# Patient Record
Sex: MALE | Race: WHITE | NOT HISPANIC OR LATINO
[De-identification: names, ages, dates, MRNs, and addresses within clinical notes are randomized per-mention and may not be internally consistent; named-entity substitution may affect disease eponyms.]

---

## 2017-01-25 ENCOUNTER — APPOINTMENT (OUTPATIENT)
Dept: GASTROENTEROLOGY | Facility: CLINIC | Age: 32
End: 2017-01-25

## 2017-11-02 ENCOUNTER — APPOINTMENT (OUTPATIENT)
Dept: CARDIOLOGY | Facility: CLINIC | Age: 32
End: 2017-11-02
Payer: SELF-PAY

## 2017-11-02 ENCOUNTER — NON-APPOINTMENT (OUTPATIENT)
Age: 32
End: 2017-11-02

## 2017-11-02 VITALS
SYSTOLIC BLOOD PRESSURE: 148 MMHG | DIASTOLIC BLOOD PRESSURE: 92 MMHG | WEIGHT: 308 LBS | OXYGEN SATURATION: 98 % | HEART RATE: 95 BPM | BODY MASS INDEX: 37.49 KG/M2

## 2017-11-02 DIAGNOSIS — Z80.1 FAMILY HISTORY OF MALIGNANT NEOPLASM OF TRACHEA, BRONCHUS AND LUNG: ICD-10-CM

## 2017-11-02 DIAGNOSIS — Z82.49 FAMILY HISTORY OF ISCHEMIC HEART DISEASE AND OTHER DISEASES OF THE CIRCULATORY SYSTEM: ICD-10-CM

## 2017-11-02 DIAGNOSIS — Z83.3 FAMILY HISTORY OF DIABETES MELLITUS: ICD-10-CM

## 2017-11-02 PROCEDURE — 93000 ELECTROCARDIOGRAM COMPLETE: CPT

## 2017-11-02 PROCEDURE — 99204 OFFICE O/P NEW MOD 45 MIN: CPT

## 2017-11-16 ENCOUNTER — APPOINTMENT (OUTPATIENT)
Dept: CARDIOLOGY | Facility: CLINIC | Age: 32
End: 2017-11-16
Payer: SELF-PAY

## 2017-11-16 PROCEDURE — 93306 TTE W/DOPPLER COMPLETE: CPT

## 2017-12-04 ENCOUNTER — APPOINTMENT (OUTPATIENT)
Dept: CARDIOLOGY | Facility: CLINIC | Age: 32
End: 2017-12-04
Payer: COMMERCIAL

## 2017-12-04 PROCEDURE — 93015 CV STRESS TEST SUPVJ I&R: CPT

## 2017-12-07 ENCOUNTER — MESSAGE (OUTPATIENT)
Age: 32
End: 2017-12-07

## 2017-12-08 ENCOUNTER — MESSAGE (OUTPATIENT)
Age: 32
End: 2017-12-08

## 2018-05-08 ENCOUNTER — APPOINTMENT (OUTPATIENT)
Dept: CARDIOLOGY | Facility: CLINIC | Age: 33
End: 2018-05-08

## 2018-07-17 ENCOUNTER — APPOINTMENT (OUTPATIENT)
Dept: INTERNAL MEDICINE | Facility: CLINIC | Age: 33
End: 2018-07-17
Payer: COMMERCIAL

## 2018-07-17 ENCOUNTER — RECORD ABSTRACTING (OUTPATIENT)
Age: 33
End: 2018-07-17

## 2018-07-17 VITALS
TEMPERATURE: 99 F | BODY MASS INDEX: 35.31 KG/M2 | HEIGHT: 76 IN | WEIGHT: 290 LBS | DIASTOLIC BLOOD PRESSURE: 80 MMHG | SYSTOLIC BLOOD PRESSURE: 110 MMHG

## 2018-07-17 DIAGNOSIS — J45.909 UNSPECIFIED ASTHMA, UNCOMPLICATED: ICD-10-CM

## 2018-07-17 DIAGNOSIS — R03.0 ELEVATED BLOOD-PRESSURE READING, W/OUT DIAGNOSIS OF HYPERTENSION: ICD-10-CM

## 2018-07-17 DIAGNOSIS — F19.20 OTHER PSYCHOACTIVE SUBSTANCE DEPENDENCE, UNCOMPLICATED: ICD-10-CM

## 2018-07-17 DIAGNOSIS — F11.11 OPIOID ABUSE, IN REMISSION: ICD-10-CM

## 2018-07-17 DIAGNOSIS — Z86.59 PERSONAL HISTORY OF OTHER MENTAL AND BEHAVIORAL DISORDERS: ICD-10-CM

## 2018-07-17 DIAGNOSIS — M51.26 OTHER INTERVERTEBRAL DISC DISPLACEMENT, LUMBAR REGION: ICD-10-CM

## 2018-07-17 DIAGNOSIS — Z87.19 PERSONAL HISTORY OF OTHER DISEASES OF THE DIGESTIVE SYSTEM: ICD-10-CM

## 2018-07-17 DIAGNOSIS — M25.529 PAIN IN UNSPECIFIED ELBOW: ICD-10-CM

## 2018-07-17 DIAGNOSIS — M54.12 RADICULOPATHY, CERVICAL REGION: ICD-10-CM

## 2018-07-17 DIAGNOSIS — Z78.9 OTHER SPECIFIED HEALTH STATUS: ICD-10-CM

## 2018-07-17 DIAGNOSIS — Z80.1 FAMILY HISTORY OF MALIGNANT NEOPLASM OF TRACHEA, BRONCHUS AND LUNG: ICD-10-CM

## 2018-07-17 DIAGNOSIS — R94.5 ABNORMAL RESULTS OF LIVER FUNCTION STUDIES: ICD-10-CM

## 2018-07-17 DIAGNOSIS — R07.89 OTHER CHEST PAIN: ICD-10-CM

## 2018-07-17 DIAGNOSIS — Z87.39 PERSONAL HISTORY OF OTHER DISEASES OF THE MUSCULOSKELETAL SYSTEM AND CONNECTIVE TISSUE: ICD-10-CM

## 2018-07-17 DIAGNOSIS — Z72.0 TOBACCO USE: ICD-10-CM

## 2018-07-17 DIAGNOSIS — R73.03 PREDIABETES.: ICD-10-CM

## 2018-07-17 DIAGNOSIS — Z71.6 TOBACCO ABUSE COUNSELING: ICD-10-CM

## 2018-07-17 DIAGNOSIS — Z72.0 TOBACCO ABUSE COUNSELING: ICD-10-CM

## 2018-07-17 DIAGNOSIS — R70.0 ELEVATED ERYTHROCYTE SEDIMENTATION RATE: ICD-10-CM

## 2018-07-17 PROCEDURE — 99215 OFFICE O/P EST HI 40 MIN: CPT

## 2018-07-17 NOTE — REVIEW OF SYSTEMS
[Fever] : fever [Chills] : chills [Fatigue] : fatigue [Nausea] : nausea [Dysuria] : dysuria [Frequency] : frequency [Negative] : Neurological [Vomiting] : no vomiting

## 2018-07-17 NOTE — PHYSICAL EXAM
[No Acute Distress] : no acute distress [Ill-Appearing] : ill-appearing [Normal Sclera/Conjunctiva] : normal sclera/conjunctiva [Normal Outer Ear/Nose] : the outer ears and nose were normal in appearance [No Respiratory Distress] : no respiratory distress  [Clear to Auscultation] : lungs were clear to auscultation bilaterally [No Accessory Muscle Use] : no accessory muscle use [Normal Rate] : normal rate  [Regular Rhythm] : with a regular rhythm [Normal S1, S2] : normal S1 and S2 [Soft] : abdomen soft [Normal] : normal [Suprapubic] : in the suprapubic area [Left] : left CVA tenderness present

## 2018-07-17 NOTE — HISTORY OF PRESENT ILLNESS
[FreeTextEntry8] : 33-year-old male smoker with history of diabetes who presented to urgent care on July 15 with complaints of dysuria. He was told he had a UTI and started on Bactrim and sent for CT to rule out a kidney stone.  CT showed a 0.4 cm stone at the right UVJ with mild hydrouteronephrosis. Since then he has developed a fever and chills. He has nausea but no vomiting. He has no difficulty with urination. He complains of bilateral flank pain and suprapubic pain. He took tylenol just before coming here.

## 2018-07-17 NOTE — ASSESSMENT
[FreeTextEntry1] : Given the CT findings and he is now febrile will send him to the ER as he may need to have an intervention for the stone and possibly IV antibiotics.

## 2018-07-20 ENCOUNTER — RESULT CHARGE (OUTPATIENT)
Age: 33
End: 2018-07-20

## 2018-07-20 LAB
BILIRUB UR QL STRIP: NORMAL
CLARITY UR: CLEAR
COLLECTION METHOD: NORMAL
GLUCOSE UR-MCNC: 100
HCG UR QL: 1 EU/DL
HGB UR QL STRIP.AUTO: NORMAL
KETONES UR-MCNC: NORMAL
LEUKOCYTE ESTERASE UR QL STRIP: NORMAL
NITRITE UR QL STRIP: NORMAL
PH UR STRIP: 6
PROT UR STRIP-MCNC: 30
SP GR UR STRIP: 1.01

## 2018-08-01 ENCOUNTER — APPOINTMENT (OUTPATIENT)
Dept: INTERNAL MEDICINE | Facility: CLINIC | Age: 33
End: 2018-08-01
Payer: COMMERCIAL

## 2018-08-01 VITALS
RESPIRATION RATE: 16 BRPM | TEMPERATURE: 97.9 F | HEART RATE: 108 BPM | HEIGHT: 76 IN | DIASTOLIC BLOOD PRESSURE: 80 MMHG | BODY MASS INDEX: 34.95 KG/M2 | WEIGHT: 287 LBS | SYSTOLIC BLOOD PRESSURE: 120 MMHG

## 2018-08-01 DIAGNOSIS — Z87.440 PERSONAL HISTORY OF URINARY (TRACT) INFECTIONS: ICD-10-CM

## 2018-08-01 PROCEDURE — G0444 DEPRESSION SCREEN ANNUAL: CPT

## 2018-08-01 PROCEDURE — 99214 OFFICE O/P EST MOD 30 MIN: CPT | Mod: 25

## 2018-08-02 RX ORDER — SULFAMETHOXAZOLE AND TRIMETHOPRIM 800; 160 MG/1; MG/1
800-160 TABLET ORAL
Refills: 0 | Status: DISCONTINUED | COMMUNITY
End: 2018-08-02

## 2018-08-02 NOTE — HISTORY OF PRESENT ILLNESS
[No Pertinent Cardiac History] : no history of aortic stenosis, atrial fibrillation, coronary artery disease, recent myocardial infarction, or implantable device/pacemaker [No Pertinent Pulmonary History] : no history of asthma, COPD, sleep apnea, or smoking [No Adverse Anesthesia Reaction] : no adverse anesthesia reaction in self or family member [Diabetes] : diabetes [Good (7-10 METs)] : Good (7-10 METs) [NSAIDs: _____] : NSAIDs: [unfilled] [FreeTextEntry1] : right retrograde pyelogram, laser lithotripsy and stent placement [FreeTextEntry2] : 8/7/28 [FreeTextEntry3] : Dr Lucho Berrios [FreeTextEntry4] : 33-year-old male smoker with history of diabetes who was seen here on July 17 after an urgent care visit for UTI. He had a CT at that time which showed a 0.4 cm stone at the right UVJ with mild hydroureternephrosis. At the time of his visit he had developed fevers and chills as well as nausea. He was sent to the emergency room for evaluation. He was admitted for 4 days and was given IV antibiotics and had a stent placed. He was discharged on 10 days of p.o. Cephalexin. He is planned for a lithotripsy and stent placement. [FreeTextEntry6] : None

## 2018-08-02 NOTE — REVIEW OF SYSTEMS
[Abdominal Pain] : abdominal pain [Vomiting] : no vomiting [Negative] : Heme/Lymph [FreeTextEntry8] : as noted in HPI  [FreeTextEntry9] : muscle spasm

## 2018-08-02 NOTE — PLAN
[FreeTextEntry1] : Hold NSAIDs prior to visit\par Hold Metformin the morning of surgery.\par Take blood pressure medications with sip of water\par May proceed with procedure\par \par Depression screening negative \par

## 2018-08-02 NOTE — ASSESSMENT
[Patient Optimized for Surgery] : Patient optimized for surgery [No Further Testing Recommended] : no further testing recommended [As per surgery] : as per surgery [FreeTextEntry7] : H

## 2018-08-02 NOTE — PHYSICAL EXAM
[No Acute Distress] : no acute distress [Well Nourished] : well nourished [Well Developed] : well developed [Normal Sclera/Conjunctiva] : normal sclera/conjunctiva [Normal Outer Ear/Nose] : the outer ears and nose were normal in appearance [Normal Oropharynx] : the oropharynx was normal [Supple] : supple [No Lymphadenopathy] : no lymphadenopathy [No Respiratory Distress] : no respiratory distress  [Clear to Auscultation] : lungs were clear to auscultation bilaterally [No Accessory Muscle Use] : no accessory muscle use [Normal Rate] : normal rate  [Regular Rhythm] : with a regular rhythm [Normal S1, S2] : normal S1 and S2 [Pedal Pulses Present] : the pedal pulses are present [No Edema] : there was no peripheral edema [Soft] : abdomen soft [Non Tender] : non-tender [Non-distended] : non-distended [Normal Bowel Sounds] : normal bowel sounds [No CVA Tenderness] : no CVA  tenderness [No Joint Swelling] : no joint swelling [No Rash] : no rash [Normal Gait] : normal gait [No Focal Deficits] : no focal deficits [Normal Affect] : the affect was normal [Alert and Oriented x3] : oriented to person, place, and time [Normal Insight/Judgement] : insight and judgment were intact

## 2018-08-06 ENCOUNTER — RX RENEWAL (OUTPATIENT)
Age: 33
End: 2018-08-06

## 2018-08-07 ENCOUNTER — RX RENEWAL (OUTPATIENT)
Age: 33
End: 2018-08-07

## 2018-08-08 ENCOUNTER — RX RENEWAL (OUTPATIENT)
Age: 33
End: 2018-08-08

## 2019-01-23 ENCOUNTER — RX RENEWAL (OUTPATIENT)
Age: 34
End: 2019-01-23

## 2019-01-30 ENCOUNTER — APPOINTMENT (OUTPATIENT)
Dept: INTERNAL MEDICINE | Facility: CLINIC | Age: 34
End: 2019-01-30
Payer: COMMERCIAL

## 2019-01-30 VITALS
WEIGHT: 306 LBS | SYSTOLIC BLOOD PRESSURE: 150 MMHG | BODY MASS INDEX: 38.05 KG/M2 | HEIGHT: 75 IN | TEMPERATURE: 98.5 F | DIASTOLIC BLOOD PRESSURE: 100 MMHG

## 2019-01-30 PROCEDURE — 36415 COLL VENOUS BLD VENIPUNCTURE: CPT

## 2019-01-30 PROCEDURE — 99214 OFFICE O/P EST MOD 30 MIN: CPT | Mod: 25

## 2019-01-30 RX ORDER — IBUPROFEN 200 MG/1
200 TABLET, FILM COATED ORAL
Refills: 0 | Status: DISCONTINUED | COMMUNITY
End: 2019-01-30

## 2019-01-30 RX ORDER — OXYBUTYNIN CHLORIDE 5 MG/1
5 TABLET, EXTENDED RELEASE ORAL
Refills: 0 | Status: DISCONTINUED | COMMUNITY
End: 2019-01-30

## 2019-01-30 RX ORDER — CEPHALEXIN 500 MG/1
500 CAPSULE ORAL 3 TIMES DAILY
Refills: 0 | Status: DISCONTINUED | COMMUNITY
End: 2019-01-30

## 2019-01-30 RX ORDER — CYCLOBENZAPRINE HYDROCHLORIDE 5 MG/1
5 TABLET, FILM COATED ORAL 3 TIMES DAILY
Qty: 30 | Refills: 0 | Status: DISCONTINUED | COMMUNITY
End: 2019-01-30

## 2019-01-30 RX ORDER — TAMSULOSIN HYDROCHLORIDE 0.4 MG/1
0.4 CAPSULE ORAL
Refills: 0 | Status: DISCONTINUED | COMMUNITY
End: 2019-01-30

## 2019-01-30 RX ORDER — AMLODIPINE BESYLATE 5 MG/1
5 TABLET ORAL DAILY
Qty: 30 | Refills: 5 | Status: DISCONTINUED | COMMUNITY
Start: 2017-11-02 | End: 2019-01-30

## 2019-01-30 NOTE — HISTORY OF PRESENT ILLNESS
[de-identified] : Ran out of Losartan due to recall.\kiarra Wants to restart Chantix.  Smokes less than 1PPD.  Had been off cigarettes totally.  Uses E-cigarette.\par Has a new job.\kiarra Wants refill of muscle relaxer for leg cramps.

## 2019-01-30 NOTE — PHYSICAL EXAM
[No Acute Distress] : no acute distress [Normal Sclera/Conjunctiva] : normal sclera/conjunctiva [Normal Outer Ear/Nose] : the outer ears and nose were normal in appearance [Supple] : supple [No Respiratory Distress] : no respiratory distress  [Clear to Auscultation] : lungs were clear to auscultation bilaterally [Normal Rate] : normal rate  [Regular Rhythm] : with a regular rhythm [No Edema] : there was no peripheral edema [Normal Anterior Cervical Nodes] : no anterior cervical lymphadenopathy [Kyphosis] : no kyphosis [Grossly Normal Strength/Tone] : grossly normal strength/tone [No Rash] : no rash [No Focal Deficits] : no focal deficits [Normal Affect] : the affect was normal

## 2019-01-30 NOTE — PLAN
[FreeTextEntry1] : Check A1c and continue metformin.\par Hypertension not controlled. Restart losartan and continue metoprolol.\par Restart Chantix. He will return to office in one month for followup smoking cessation.\par Refill Flexeril for muscle spasms.

## 2019-02-03 LAB
ALBUMIN SERPL ELPH-MCNC: 4.9 G/DL
ALP BLD-CCNC: 114 U/L
ALT SERPL-CCNC: 68 U/L
ANION GAP SERPL CALC-SCNC: 15 MMOL/L
AST SERPL-CCNC: 37 U/L
BILIRUB SERPL-MCNC: 0.2 MG/DL
BUN SERPL-MCNC: 17 MG/DL
CALCIUM SERPL-MCNC: 10 MG/DL
CHLORIDE SERPL-SCNC: 99 MMOL/L
CO2 SERPL-SCNC: 25 MMOL/L
CREAT SERPL-MCNC: 1.05 MG/DL
GLUCOSE SERPL-MCNC: 158 MG/DL
HBA1C MFR BLD HPLC: 8 %
POTASSIUM SERPL-SCNC: 4.7 MMOL/L
PROT SERPL-MCNC: 7.3 G/DL
SODIUM SERPL-SCNC: 139 MMOL/L

## 2019-02-04 ENCOUNTER — RX CHANGE (OUTPATIENT)
Age: 34
End: 2019-02-04

## 2019-02-10 ENCOUNTER — RX RENEWAL (OUTPATIENT)
Age: 34
End: 2019-02-10

## 2019-02-27 ENCOUNTER — APPOINTMENT (OUTPATIENT)
Dept: INTERNAL MEDICINE | Facility: CLINIC | Age: 34
End: 2019-02-27

## 2019-04-11 ENCOUNTER — APPOINTMENT (OUTPATIENT)
Dept: INTERNAL MEDICINE | Facility: CLINIC | Age: 34
End: 2019-04-11
Payer: COMMERCIAL

## 2019-04-11 VITALS
BODY MASS INDEX: 36.68 KG/M2 | DIASTOLIC BLOOD PRESSURE: 98 MMHG | SYSTOLIC BLOOD PRESSURE: 150 MMHG | WEIGHT: 295 LBS | TEMPERATURE: 96.6 F | HEIGHT: 75 IN

## 2019-04-11 VITALS — SYSTOLIC BLOOD PRESSURE: 130 MMHG | DIASTOLIC BLOOD PRESSURE: 90 MMHG

## 2019-04-11 PROCEDURE — 99214 OFFICE O/P EST MOD 30 MIN: CPT | Mod: 25

## 2019-04-11 PROCEDURE — 36415 COLL VENOUS BLD VENIPUNCTURE: CPT

## 2019-04-11 NOTE — HISTORY OF PRESENT ILLNESS
[FreeTextEntry1] : HTN [de-identified] : Stopped soda and started eating salad.  Lost wt.\par BP has been high at walk-in clinics.Thinks he needs more BP meds.\par Had taken chantix and then quit smoking mostly.  This week he started again.\par He's only been taking one metformin tab b.i.d.\par He has mucus for the last 8 weeks. Has to blow his nose with green mucus.

## 2019-04-11 NOTE — PLAN
[FreeTextEntry1] : 3 start Chantix for smoking cessation.\par Hypertension uncontrolled. Will increase metoprolol from 25-50 mg. He was instructed to take losartan in the morning and metoprolol at night.\par Check hemoglobin A1c. Continue metformin.\par Try prednisone for rhinitis \par Return to office in 3 months for a physical

## 2019-04-11 NOTE — PHYSICAL EXAM
[No Acute Distress] : no acute distress [Normal Sclera/Conjunctiva] : normal sclera/conjunctiva [Normal Outer Ear/Nose] : the outer ears and nose were normal in appearance [Supple] : supple [No Respiratory Distress] : no respiratory distress  [Clear to Auscultation] : lungs were clear to auscultation bilaterally [Normal Rate] : normal rate  [Regular Rhythm] : with a regular rhythm [No Edema] : there was no peripheral edema [Normal Anterior Cervical Nodes] : no anterior cervical lymphadenopathy [Grossly Normal Strength/Tone] : grossly normal strength/tone [No Rash] : no rash [No Focal Deficits] : no focal deficits [Normal Affect] : the affect was normal [Kyphosis] : no kyphosis

## 2019-04-14 ENCOUNTER — MESSAGE (OUTPATIENT)
Age: 34
End: 2019-04-14

## 2019-04-14 LAB
ESTIMATED AVERAGE GLUCOSE: 203 MG/DL
HBA1C MFR BLD HPLC: 8.7 %

## 2019-04-16 ENCOUNTER — RX RENEWAL (OUTPATIENT)
Age: 34
End: 2019-04-16

## 2019-04-29 ENCOUNTER — RX CHANGE (OUTPATIENT)
Age: 34
End: 2019-04-29

## 2019-04-30 ENCOUNTER — RX RENEWAL (OUTPATIENT)
Age: 34
End: 2019-04-30

## 2019-05-01 ENCOUNTER — MESSAGE (OUTPATIENT)
Age: 34
End: 2019-05-01

## 2019-05-02 ENCOUNTER — RX RENEWAL (OUTPATIENT)
Age: 34
End: 2019-05-02

## 2019-05-10 ENCOUNTER — RESULT REVIEW (OUTPATIENT)
Age: 34
End: 2019-05-10

## 2019-06-21 ENCOUNTER — RX CHANGE (OUTPATIENT)
Age: 34
End: 2019-06-21

## 2019-06-24 ENCOUNTER — RX CHANGE (OUTPATIENT)
Age: 34
End: 2019-06-24

## 2019-07-22 ENCOUNTER — RX RENEWAL (OUTPATIENT)
Age: 34
End: 2019-07-22

## 2019-07-24 ENCOUNTER — RX RENEWAL (OUTPATIENT)
Age: 34
End: 2019-07-24

## 2019-09-09 ENCOUNTER — APPOINTMENT (OUTPATIENT)
Dept: INTERNAL MEDICINE | Facility: CLINIC | Age: 34
End: 2019-09-09
Payer: COMMERCIAL

## 2019-09-09 VITALS
SYSTOLIC BLOOD PRESSURE: 120 MMHG | TEMPERATURE: 99.1 F | BODY MASS INDEX: 33.57 KG/M2 | HEIGHT: 75 IN | WEIGHT: 270 LBS | DIASTOLIC BLOOD PRESSURE: 90 MMHG

## 2019-09-09 PROCEDURE — 36415 COLL VENOUS BLD VENIPUNCTURE: CPT

## 2019-09-09 PROCEDURE — 99214 OFFICE O/P EST MOD 30 MIN: CPT | Mod: 25

## 2019-09-09 RX ORDER — VARENICLINE TARTRATE 1 MG/1
1 TABLET, FILM COATED ORAL
Qty: 20 | Refills: 2 | Status: COMPLETED | COMMUNITY
Start: 2019-05-02 | End: 2019-09-09

## 2019-09-09 RX ORDER — VARENICLINE TARTRATE 0.5 (11)-1
0.5 MG X 11 & KIT ORAL
Qty: 1 | Refills: 0 | Status: COMPLETED | COMMUNITY
Start: 2019-01-30 | End: 2019-09-09

## 2019-09-09 RX ORDER — PREDNISONE 20 MG/1
20 TABLET ORAL
Qty: 10 | Refills: 0 | Status: COMPLETED | COMMUNITY
Start: 2019-04-11 | End: 2019-09-09

## 2019-09-09 NOTE — PLAN
[FreeTextEntry1] : Discussed smoking cessation in depth with pt.  He will try acupuncture and or hypnosis as he has tried others in the past.\par His BP has been elevated last visit and today as well so he will increase Losartan to 100mg daily\par BW done today in office and will be advised of results\par Pt is due as well for FBW and will do next visit.  \par Follow up again in 3 months

## 2019-09-09 NOTE — PHYSICAL EXAM
[Normal] : normal rate, regular rhythm, normal S1 and S2 and no murmur heard [de-identified] : mild muscle tightness BL paraspinal muscles lumbar area

## 2019-09-09 NOTE — HISTORY OF PRESENT ILLNESS
[FreeTextEntry1] : 35 y/o male present for a f/u visit and med renewals.  [de-identified] : Pt presents to the office today for follow up.\par He has been improving his diet and losing weight\par He is still smoking about 1 PPD has tried chantix with poor results\par He has been having exacerbation of muscle spasms in back

## 2019-09-16 LAB
ALBUMIN SERPL ELPH-MCNC: 4.9 G/DL
ALP BLD-CCNC: 157 U/L
ALT SERPL-CCNC: 29 U/L
ANION GAP SERPL CALC-SCNC: 19 MMOL/L
AST SERPL-CCNC: 12 U/L
BASOPHILS # BLD AUTO: 0.08 K/UL
BASOPHILS NFR BLD AUTO: 0.6 %
BILIRUB SERPL-MCNC: 0.3 MG/DL
BUN SERPL-MCNC: 16 MG/DL
CALCIUM SERPL-MCNC: 10.4 MG/DL
CHLORIDE SERPL-SCNC: 91 MMOL/L
CO2 SERPL-SCNC: 24 MMOL/L
CREAT SERPL-MCNC: 0.81 MG/DL
CREAT SPEC-SCNC: 53 MG/DL
EOSINOPHIL # BLD AUTO: 0.51 K/UL
EOSINOPHIL NFR BLD AUTO: 3.7 %
ESTIMATED AVERAGE GLUCOSE: 301 MG/DL
GLUCOSE SERPL-MCNC: 404 MG/DL
HBA1C MFR BLD HPLC: 12.1 %
HCT VFR BLD CALC: 56.2 %
HGB BLD-MCNC: 18.6 G/DL
IMM GRANULOCYTES NFR BLD AUTO: 0.6 %
LYMPHOCYTES # BLD AUTO: 3.8 K/UL
LYMPHOCYTES NFR BLD AUTO: 27.3 %
MAN DIFF?: NORMAL
MCHC RBC-ENTMCNC: 29.5 PG
MCHC RBC-ENTMCNC: 33.1 GM/DL
MCV RBC AUTO: 89.1 FL
MICROALBUMIN 24H UR DL<=1MG/L-MCNC: 2.7 MG/DL
MICROALBUMIN/CREAT 24H UR-RTO: 51 MG/G
MONOCYTES # BLD AUTO: 0.98 K/UL
MONOCYTES NFR BLD AUTO: 7 %
NEUTROPHILS # BLD AUTO: 8.46 K/UL
NEUTROPHILS NFR BLD AUTO: 60.8 %
PLATELET # BLD AUTO: 254 K/UL
POTASSIUM SERPL-SCNC: 4.5 MMOL/L
PROT SERPL-MCNC: 7.2 G/DL
RBC # BLD: 6.31 M/UL
RBC # FLD: 12.5 %
SODIUM SERPL-SCNC: 133 MMOL/L
WBC # FLD AUTO: 13.91 K/UL

## 2019-09-20 ENCOUNTER — RX RENEWAL (OUTPATIENT)
Age: 34
End: 2019-09-20

## 2019-09-23 ENCOUNTER — APPOINTMENT (OUTPATIENT)
Dept: ENDOCRINOLOGY | Facility: CLINIC | Age: 34
End: 2019-09-23
Payer: COMMERCIAL

## 2019-09-23 VITALS
HEIGHT: 75 IN | HEART RATE: 111 BPM | BODY MASS INDEX: 33.94 KG/M2 | OXYGEN SATURATION: 97 % | WEIGHT: 273 LBS | SYSTOLIC BLOOD PRESSURE: 132 MMHG | DIASTOLIC BLOOD PRESSURE: 78 MMHG

## 2019-09-23 LAB — GLUCOSE BLDC GLUCOMTR-MCNC: 400

## 2019-09-23 PROCEDURE — 99213 OFFICE O/P EST LOW 20 MIN: CPT | Mod: 25

## 2019-09-23 PROCEDURE — 95250 CONT GLUC MNTR PHYS/QHP EQP: CPT

## 2019-09-23 PROCEDURE — 99243 OFF/OP CNSLTJ NEW/EST LOW 30: CPT | Mod: 25

## 2019-09-23 PROCEDURE — 82962 GLUCOSE BLOOD TEST: CPT | Mod: NC

## 2019-09-23 NOTE — HISTORY OF PRESENT ILLNESS
[FreeTextEntry1] : Quality: Type 2 DM\par Severity: uncontrolled \par Duration: 10 years ago - 3 years ago started Metformin  \par Onset: routine labs \par Modifying Factors:  Worse with diet \par Associated Symptoms: right foot with neuropathy \par Family History: Mother T1DM, ovarian cancer (passed 58)  and Father T2DM, brain tumor (passed 64), Grandmother pancreatic cancer\par \par Current Regimen:\par Metformin 500 mg 3 tabs daily\par \par Self blood sugar monitoring: none\par  today - Basaglar given 10 units today \par \par exercise: none\par \par Diet:\par B- protein shake \par L- salad\par D- chicken, steak, vegetables\par Snacks - cliif bar, bedtime snacks - pretzels, ice cream\par \par Date of last eye exam: 1 year (-) diabetic retinopathy \par Date of last foot exam: none\par Date of last flu vaccine: 2018\par Date of last Pneumovax: none \par \par PMH:\par IBS

## 2019-09-23 NOTE — ASSESSMENT
[FreeTextEntry1] : 33 y/o male with Type 2 DM and HTN. Labs reviewed from 9/9/19 - A1C 12.1%\par \par \par Plan: \par Type 2 DM: uncontrolled - needs close follow up \par - alberta professional placed today\par - start Basaglar 10 units at HS\par - start self blood sugar monitoring 2 times a day - meter given \par - send in logs in 3 days\par - educated on healthy food choices\par - encouraged to increase routine exercise - 30 minutes daily\par \par HTN: stable, continue current medication regimen\par \par Current smoker: educated on the benefits of smoking cessation, pt. not ready to quit \par \par Follow up in 2 weeks with CDE for alberta professional results and diet education, then another visit in 6 weeks.   [Smoking Cessation] : smoking cessation [Importance of Diet and Exercise] : importance of diet and exercise to improve glycemic control, achieve weight loss and improve cardiovascular health

## 2019-09-23 NOTE — REVIEW OF SYSTEMS
[Fatigue] : fatigue [Recent Weight Loss (___ Lbs)] : recent [unfilled] ~Ulb weight loss [Neck Pain] : neck pain [Constipation] : constipation [Anxiety] : anxiety [Decreased Appetite] : appetite not decreased [Visual Field Defect] : no visual field defect [Blurry Vision] : no blurred vision [Dysphonia] : no dysphonia [Dysphagia] : no dysphagia [Palpitations] : no palpitations [Chest Pain] : no chest pain [Diarrhea] : no diarrhea [Polyuria] : no polyuria [Dysuria] : no dysuria [Headache] : no headaches [Tremors] : no tremors [Depression] : no depression [Polydipsia] : no polydipsia [Cold Intolerance] : cold tolerant [Easy Bruising] : no tendency for easy bruising [Heat Intolerance] : heat tolerant [FreeTextEntry4] : posteriior neck pain r/t herniated disc [Swelling] : no swelling [de-identified] : sometimes, will start seeing counselor

## 2019-09-23 NOTE — PHYSICAL EXAM
[Alert] : alert [No Acute Distress] : no acute distress [Well Nourished] : well nourished [Well Developed] : well developed [Normal Sclera/Conjunctiva] : normal sclera/conjunctiva [EOMI] : extra ocular movement intact [Normal Hearing] : hearing was normal [Supple] : the neck was supple [No LAD] : no lymphadenopathy [Thyroid Not Enlarged] : the thyroid was not enlarged [No Thyroid Nodules] : there were no palpable thyroid nodules [Normal Rate and Effort] : normal respiratory rhythm and effort [No Accessory Muscle Use] : no accessory muscle use [Clear to Auscultation] : lungs were clear to auscultation bilaterally [Normal Rate] : heart rate was normal  [Normal S1, S2] : normal S1 and S2 [Regular Rhythm] : with a regular rhythm [Pedal Pulses Normal] : the pedal pulses are present [No Edema] : there was no peripheral edema [Normal Bowel Sounds] : normal bowel sounds [Not Tender] : non-tender [Soft] : abdomen soft [Normal Gait] : normal gait [Right Foot Was Examined] : right foot ~C was examined [Left Foot Was Examined] : left foot ~C was examined [Normal] : normal [No Motor Deficits] : the motor exam was normal [No Tremors] : no tremors [Oriented x3] : oriented to person, place, and time [Normal Insight/Judgement] : insight and judgment were intact [Normal Mood] : the mood was normal [Foot Ulcers] : no foot ulcers [Acanthosis Nigricans] : no acanthosis nigricans [#1 Diminished] : number 1 was normal [#2 Diminished] : number 2 was normal [#3 Diminished] : number 3 was normal [#4 Diminished] : number 4 was normal [#6 Diminished] : number 6 was normal [#5 Diminished] : number 5 was normal [#8 Diminished] : number 8 was normal [#7 Diminished] : number 7 was normal [#9 Diminished] : number 9 was normal

## 2019-09-25 ENCOUNTER — RESULT REVIEW (OUTPATIENT)
Age: 34
End: 2019-09-25

## 2019-09-25 LAB
25(OH)D3 SERPL-MCNC: 33.5 NG/ML
CHOLEST SERPL-MCNC: 182 MG/DL
CHOLEST/HDLC SERPL: 6.5 RATIO
HDLC SERPL-MCNC: 28 MG/DL
LDLC SERPL CALC-MCNC: NORMAL MG/DL
T3FREE SERPL-MCNC: 3.16 PG/ML
T4 FREE SERPL-MCNC: 1.5 NG/DL
TRIGL SERPL-MCNC: 504 MG/DL
TSH SERPL-ACNC: 1.45 UIU/ML
VIT B12 SERPL-MCNC: 1655 PG/ML

## 2019-10-14 ENCOUNTER — APPOINTMENT (OUTPATIENT)
Dept: ENDOCRINOLOGY | Facility: CLINIC | Age: 34
End: 2019-10-14
Payer: COMMERCIAL

## 2019-10-14 PROCEDURE — G0108 DIAB MANAGE TRN  PER INDIV: CPT

## 2019-10-16 ENCOUNTER — MEDICATION RENEWAL (OUTPATIENT)
Age: 34
End: 2019-10-16

## 2019-10-17 ENCOUNTER — RESULT CHARGE (OUTPATIENT)
Age: 34
End: 2019-10-17

## 2019-10-17 ENCOUNTER — RX RENEWAL (OUTPATIENT)
Age: 34
End: 2019-10-17

## 2019-10-17 RX ORDER — INSULIN GLARGINE 100 [IU]/ML
100 INJECTION, SOLUTION SUBCUTANEOUS AT BEDTIME
Qty: 1 | Refills: 0 | Status: DISCONTINUED | COMMUNITY
Start: 2019-09-23 | End: 2019-10-17

## 2019-11-14 ENCOUNTER — APPOINTMENT (OUTPATIENT)
Dept: CARDIOLOGY | Facility: CLINIC | Age: 34
End: 2019-11-14
Payer: COMMERCIAL

## 2019-11-14 ENCOUNTER — NON-APPOINTMENT (OUTPATIENT)
Age: 34
End: 2019-11-14

## 2019-11-14 VITALS
OXYGEN SATURATION: 97 % | BODY MASS INDEX: 34.82 KG/M2 | DIASTOLIC BLOOD PRESSURE: 80 MMHG | HEIGHT: 75 IN | WEIGHT: 280 LBS | SYSTOLIC BLOOD PRESSURE: 148 MMHG | HEART RATE: 98 BPM

## 2019-11-14 DIAGNOSIS — Z72.89 OTHER PROBLEMS RELATED TO LIFESTYLE: ICD-10-CM

## 2019-11-14 DIAGNOSIS — F17.200 NICOTINE DEPENDENCE, UNSPECIFIED, UNCOMPLICATED: ICD-10-CM

## 2019-11-14 PROCEDURE — 99214 OFFICE O/P EST MOD 30 MIN: CPT

## 2019-11-14 PROCEDURE — 93000 ELECTROCARDIOGRAM COMPLETE: CPT

## 2019-11-14 RX ORDER — CYCLOBENZAPRINE HYDROCHLORIDE 10 MG/1
10 TABLET, FILM COATED ORAL 3 TIMES DAILY
Qty: 45 | Refills: 0 | Status: DISCONTINUED | COMMUNITY
Start: 2019-01-30 | End: 2019-11-14

## 2019-11-14 RX ORDER — FLASH GLUCOSE SENSOR
KIT MISCELLANEOUS
Qty: 2 | Refills: 2 | Status: DISCONTINUED | COMMUNITY
Start: 2019-10-14 | End: 2019-11-14

## 2019-11-14 NOTE — ADDENDUM
[FreeTextEntry1] : Please note the patient was reviewed with NP Dyan Parker.\kiarra I was physically present during the service of the patient.\kiarra I was directly involved in the management plan and recommendations of the care provided to the patient. \par I personally reviewed the history and physical examination as documented by the NP above.\par Nov 14 2019  8:30AM\par

## 2019-11-14 NOTE — HISTORY OF PRESENT ILLNESS
[FreeTextEntry1] : Jeremy is a pleasant 33 y/o male seen today in cardiac consultation to review outside ETT.\par \par ETT was requested by his job at [employer]. 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 He performed 7 min 9 sec of Rj protocol which is fair. 9.7 METS. Stopped d/t LE fatigue. Achieved 87% MPHR. Peak /92. No chest pain. \par \par He has a PMH of diabetes type II (uncontrolled, on insulin and oral agents), obesity, chronic smoking, hypertension, hyperlipidemia, and FHx CAD. \par There is no prior history of known CAD, MI, CHF, AF, CVA. \par \par Denies any symptoms of exertional chest pain or discomfort. Denies unusual shortness of breath, orthopnea, weight gain, or LE edema. Denies palpitations, lightheadedness, dizziness, or syncope. \par \par EKG reviewed today reveals IRBBB and nonspecific ST-T wave abnormalities.\par Labs from 10/2019. AST 18, ALT 30, alk phos 147, HbA1c 11.8, K 4.6, creat 0.88, , HDL 28, trigs 581, unable to calculate LDL\par Abd US 5/2019 revealed no significant plaque and no aneurysm. \par Echo in 2017 revealed LVEF >75% with normal septum and chamber dimensions, normal LV systolic function, no significant VHD. \par \par There is add'l history of steatohepatitis with most recent LETs noted above.

## 2019-11-14 NOTE — PHYSICAL EXAM
[General Appearance - Well Developed] : well developed [Normal Appearance] : normal appearance [Well Groomed] : well groomed [General Appearance - Well Nourished] : well nourished [No Deformities] : no deformities [General Appearance - In No Acute Distress] : no acute distress [Heart Rate And Rhythm] : heart rate and rhythm were normal [Murmurs] : no murmurs present [Heart Sounds] : normal S1 and S2 [Exaggerated Use Of Accessory Muscles For Inspiration] : no accessory muscle use [Respiration, Rhythm And Depth] : normal respiratory rhythm and effort [Auscultation Breath Sounds / Voice Sounds] : lungs were clear to auscultation bilaterally [Abdomen Soft] : soft [Abdomen Tenderness] : non-tender [Abnormal Walk] : normal gait [Gait - Sufficient For Exercise Testing] : the gait was sufficient for exercise testing [Cyanosis, Localized] : no localized cyanosis [Nail Clubbing] : no clubbing of the fingernails [Petechial Hemorrhages (___cm)] : no petechial hemorrhages [Skin Color & Pigmentation] : normal skin color and pigmentation [] : no rash [Skin Lesions] : no skin lesions [No Venous Stasis] : no venous stasis [No Skin Ulcers] : no skin ulcer [No Xanthoma] : no  xanthoma was observed [Oriented To Time, Place, And Person] : oriented to person, place, and time [Affect] : the affect was normal [Mood] : the mood was normal [No Anxiety] : not feeling anxious [FreeTextEntry1] : no JVD, decr carotid upstroke

## 2019-11-14 NOTE — ASSESSMENT
[FreeTextEntry1] : ELEAZAR VAZQUEZ is a 34 year old M who presents today Nov 14, 2019 with the above history and the following active issues: \par \par Abnl EKG. \par Multiple atherosclerotic risk factors: HTN, HLD, obesity, FHx, DM, smoking. \par No significant ischemia on ETT. No anginal symptoms. \par Obtain 2d echocardiogram to evaluate resting heart structure, valvular function, and LVEF. \par Obtain carotid ultrasound to evaluate for evidence of significant carotid atherosclerosis or obstruction. \par Discussed in great length risk factor modification measures as noted below. \par \par HTN, 140/86 on my exam. \par He reports taking meds only 30mins prior to appt. BP response borderline elevated on ETT. \par Obtain echo to monitor for HHD. \par Discussed importance of long term BP control.  Reviewed goal < 130/80.\par Continue toprol 50mg daily and losartan 100mg daily. \par Educated patient on low salt diet, alcohol intake in moderation, regular cardiovascular exercise, and weight reduction for improved BP control.\par \par HLD, low HDL, high trigs. DM. \par Dietary modification measures and glucose control discussed. \par Goal A1c eventually <7. As managed by endocrinology. \par Discussed risks, benefits, and alternatives to statin therapy. \par Start crestor 10mg daily. Check lipids, LETs, and CPK in 6-8 wks. \par \par Obesity. \par Dietary changes and follow active lifestyle and regular cardiovascular exercise to further reduce CVD risk. \par Denies any s/s of RACHEL. \par \par Nicotine dependence.\par Strongly encouraged smoking cessation. Educated on long term adverse effects of smoking on cardiovascular and overall health. Referral made to smoking cessation program. \par \par Will call to review above testing. Otherwise, clinical followup in 6 months. \par Reviewed symptoms which would warrant sooner eval. \par Any questions and concerns were addressed and resolved. \par \par Sincerely,\par \par HAIR Berry\par Patients history, testing, and plan reviewed with supervising MD: Dr. Anthony Wilkerson

## 2019-11-14 NOTE — REASON FOR VISIT
[Consultation] : a consultation regarding [Abnormal ECG] : an abnormal ECG [Hyperlipidemia] : hyperlipidemia [Hypertension] : hypertension [Medication Management] : Medication management

## 2019-11-19 ENCOUNTER — RX CHANGE (OUTPATIENT)
Age: 34
End: 2019-11-19

## 2019-12-03 ENCOUNTER — APPOINTMENT (OUTPATIENT)
Dept: CARDIOLOGY | Facility: CLINIC | Age: 34
End: 2019-12-03

## 2019-12-18 ENCOUNTER — OTHER (OUTPATIENT)
Age: 34
End: 2019-12-18

## 2019-12-18 ENCOUNTER — APPOINTMENT (OUTPATIENT)
Dept: CARDIOLOGY | Facility: CLINIC | Age: 34
End: 2019-12-18
Payer: COMMERCIAL

## 2019-12-18 ENCOUNTER — RX RENEWAL (OUTPATIENT)
Age: 34
End: 2019-12-18

## 2019-12-18 PROCEDURE — 93306 TTE W/DOPPLER COMPLETE: CPT

## 2019-12-30 ENCOUNTER — APPOINTMENT (OUTPATIENT)
Dept: ENDOCRINOLOGY | Facility: CLINIC | Age: 34
End: 2019-12-30

## 2020-01-08 ENCOUNTER — RX RENEWAL (OUTPATIENT)
Age: 35
End: 2020-01-08

## 2020-01-17 ENCOUNTER — APPOINTMENT (OUTPATIENT)
Dept: ENDOCRINOLOGY | Facility: CLINIC | Age: 35
End: 2020-01-17
Payer: COMMERCIAL

## 2020-01-17 VITALS
OXYGEN SATURATION: 98 % | DIASTOLIC BLOOD PRESSURE: 80 MMHG | HEIGHT: 75 IN | HEART RATE: 87 BPM | WEIGHT: 280 LBS | BODY MASS INDEX: 34.82 KG/M2 | SYSTOLIC BLOOD PRESSURE: 130 MMHG

## 2020-01-17 LAB — GLUCOSE BLDC GLUCOMTR-MCNC: 174

## 2020-01-17 PROCEDURE — 99214 OFFICE O/P EST MOD 30 MIN: CPT | Mod: 25

## 2020-01-17 PROCEDURE — 82962 GLUCOSE BLOOD TEST: CPT

## 2020-01-17 NOTE — HISTORY OF PRESENT ILLNESS
[FreeTextEntry1] : T2DM\par Severity: uncontrolled\par Onset: routine labs\par Duration: 10 years ago \par Modifying Factors: worse with diet \par \par SMBG\par Checking BS once a day\par FS fasting average 120-150\par  in office \par \par Current drug regimen\par Lantus 16 units\par  mg 2 tabs BID\par \par Was prescribed humalog with meals but has not been taking\par \par Eye exam: end of 2019, denies DR \par Foot exam: does not see podiatry- denies numbness/tingling in b/l feet\par Kidney disease: denies- has had kidney stone in the past\par Heart disease: denies- just saw cardiology , did full work up- all negative \par \par Weight: stable \par Diet: wife cooks- tries not to eat out a lot \par B: asim bars, protein shake, apples\par L: left overs, salad\par D: crockpot meals \par S: goldfish, drinks one can of regular soda day\par Exercise: works hard at work but tries to walk every other day- mile at a time \par Smoking : pack a day- motivated to quit \par \par HTN\par /80\par Metoprolol 50 mg daily\par Losartan 100 mg daily\par \par High Triglycerides/HLD\par Rosuvastatin 10 mg daily \par Triglycerides most likely high due to high sugars \par \par \par +Smoker

## 2020-01-17 NOTE — ASSESSMENT
[FreeTextEntry1] : T2DM\par -Continue  mg 2 tabs BID\par -Continue Lantus(Basaglar for insurance coverage) 16 units\par -Increase FS to 2x a day, need to see how BS are in the day to evaluate if him self stopping humalog was appropriate\par -May benefit from GLP, will discuss on telephone when reviewing labs\par -Increase exercise as tolerated, goal of 30 mins a day 5x a week\par -Labs today,will call with results\par \par HLD\par Continue statin\par Labs today, will call with results\par \par \par HTN\par Continue ARB and beta blocker as per cardiology \par \par Tobacco abuse\par Motivated to quit, has been on chantix in the past\par \par RTO in 3 months

## 2020-01-17 NOTE — REVIEW OF SYSTEMS
[Nocturia] : nocturia [Joint Stiffness] : joint stiffness [Joint Pain] : joint pain [Back Pain] : back pain [Dry Skin] : dry skin [Polydipsia] : polydipsia [Fatigue] : no fatigue [Recent Weight Gain (___ Lbs)] : no recent weight gain [Recent Weight Loss (___ Lbs)] : no recent weight loss [Visual Field Defect] : no visual field defect [Blurry Vision] : no blurred vision [Dysphagia] : no dysphagia [Dysphonia] : no dysphonia [Neck Pain] : no neck pain [Chest Pain] : no chest pain [Palpitations] : no palpitations [Shortness Of Breath] : no shortness of breath [Wheezing] : no wheezing was heard [Nausea] : no nausea [Vomiting] : no vomiting was observed [Constipation] : no constipation [Diarrhea] : no diarrhea [Polyuria] : no polyuria [Acanthosis] : no acanthosis  [Headache] : no headaches [Tremors] : no tremors [Depression] : no depression [Anxiety] : no anxiety [Cold Intolerance] : cold tolerant [Heat Intolerance] : heat tolerant

## 2020-01-20 LAB
25(OH)D3 SERPL-MCNC: 42.2 NG/ML
ALBUMIN SERPL ELPH-MCNC: 4.8 G/DL
ALP BLD-CCNC: 98 U/L
ALT SERPL-CCNC: 28 U/L
ANION GAP SERPL CALC-SCNC: 14 MMOL/L
AST SERPL-CCNC: 17 U/L
BASOPHILS # BLD AUTO: 0.08 K/UL
BASOPHILS NFR BLD AUTO: 0.7 %
BILIRUB SERPL-MCNC: 0.3 MG/DL
BUN SERPL-MCNC: 14 MG/DL
CALCIUM SERPL-MCNC: 10 MG/DL
CHLORIDE SERPL-SCNC: 98 MMOL/L
CHOLEST SERPL-MCNC: 146 MG/DL
CHOLEST/HDLC SERPL: 4.2 RATIO
CO2 SERPL-SCNC: 26 MMOL/L
CREAT SERPL-MCNC: 0.77 MG/DL
EOSINOPHIL # BLD AUTO: 0.91 K/UL
EOSINOPHIL NFR BLD AUTO: 7.8 %
ESTIMATED AVERAGE GLUCOSE: 166 MG/DL
GLUCOSE SERPL-MCNC: 167 MG/DL
HBA1C MFR BLD HPLC: 7.4 %
HCT VFR BLD CALC: 51.2 %
HDLC SERPL-MCNC: 35 MG/DL
HGB BLD-MCNC: 17.1 G/DL
IMM GRANULOCYTES NFR BLD AUTO: 0.5 %
LDLC SERPL CALC-MCNC: 60 MG/DL
LYMPHOCYTES # BLD AUTO: 3.47 K/UL
LYMPHOCYTES NFR BLD AUTO: 29.7 %
MAN DIFF?: NORMAL
MCHC RBC-ENTMCNC: 29.2 PG
MCHC RBC-ENTMCNC: 33.4 GM/DL
MCV RBC AUTO: 87.4 FL
MONOCYTES # BLD AUTO: 0.76 K/UL
MONOCYTES NFR BLD AUTO: 6.5 %
NEUTROPHILS # BLD AUTO: 6.42 K/UL
NEUTROPHILS NFR BLD AUTO: 54.8 %
PLATELET # BLD AUTO: 253 K/UL
POTASSIUM SERPL-SCNC: 4.7 MMOL/L
PROT SERPL-MCNC: 6.9 G/DL
RBC # BLD: 5.86 M/UL
RBC # FLD: 12.5 %
SODIUM SERPL-SCNC: 137 MMOL/L
T3FREE SERPL-MCNC: 3.61 PG/ML
T4 FREE SERPL-MCNC: 1.4 NG/DL
TRIGL SERPL-MCNC: 254 MG/DL
TSH SERPL-ACNC: 1.66 UIU/ML
VIT B12 SERPL-MCNC: 1234 PG/ML
WBC # FLD AUTO: 11.7 K/UL

## 2020-03-29 ENCOUNTER — RX RENEWAL (OUTPATIENT)
Age: 35
End: 2020-03-29

## 2020-04-24 ENCOUNTER — APPOINTMENT (OUTPATIENT)
Dept: ENDOCRINOLOGY | Facility: CLINIC | Age: 35
End: 2020-04-24

## 2020-05-04 ENCOUNTER — RX RENEWAL (OUTPATIENT)
Age: 35
End: 2020-05-04

## 2020-05-11 ENCOUNTER — APPOINTMENT (OUTPATIENT)
Dept: CARDIOLOGY | Facility: CLINIC | Age: 35
End: 2020-05-11

## 2020-06-15 ENCOUNTER — APPOINTMENT (OUTPATIENT)
Dept: CARDIOLOGY | Facility: CLINIC | Age: 35
End: 2020-06-15
Payer: COMMERCIAL

## 2020-06-15 VITALS
SYSTOLIC BLOOD PRESSURE: 150 MMHG | WEIGHT: 280 LBS | DIASTOLIC BLOOD PRESSURE: 90 MMHG | HEIGHT: 74 IN | OXYGEN SATURATION: 98 % | BODY MASS INDEX: 35.94 KG/M2 | HEART RATE: 91 BPM

## 2020-06-15 PROCEDURE — 99214 OFFICE O/P EST MOD 30 MIN: CPT

## 2020-06-15 NOTE — ASSESSMENT
[FreeTextEntry1] : ELEAZAR VAZQUEZ is a 35 year old M who presents today Juanjo 15, 2020 with the above history and the following active issues: \par \par Abnl EKG. \par Multiple atherosclerotic risk factors: HTN, HLD, obesity, FHx, DM, smoking. \par No significant ischemia on ETT. No anginal symptoms. Normal LVEF.\par Recommend cardiac calcium score to determine how aggressive risk factor modification will need to be. \par Discussed in great length risk factor modification measures as noted below. \par \par HTN, 138/90 on my exam.  HR 90s. \par Discussed importance of long term BP control.  Reviewed goal < 130/80.\par Increase toprol to 50mg BID. \par Continue losartan 100mg daily. \par Educated patient on low salt diet, alcohol intake in moderation, regular cardiovascular exercise, and weight reduction for improved BP control.\par \par HLD, low HDL, high trigs. DM. \par Dietary modification measures and glucose control discussed. \par Goal A1c eventually <7. As managed by endocrinology. Goal LDL <70, at goal. \par Continue crestor 10mg daily and lifestyle modification measures . \par \par Obesity. \par Dietary changes and follow active lifestyle and regular cardiovascular exercise to further reduce CVD risk. \par Denies any s/s of RACHEL. \par \par Nicotine dependence.\par Strongly encouraged smoking cessation. Will obtain chantix with PCP. He is motivated to quit. \par \par Will call to review above testing. Otherwise, clinical followup in 6 months. \par Reviewed symptoms which would warrant sooner eval. \par Any questions and concerns were addressed and resolved. \par \par Sincerely,\par \par HAIR Berry\par Patients history, testing, and plan reviewed with supervising MD: Dr. Jorge Sheppard

## 2020-06-15 NOTE — HISTORY OF PRESENT ILLNESS
[FreeTextEntry1] : Jeremy is a pleasant 34 y/o male seen today in routine clinical followup. \par \par He works at [employer]. \par He has a PMH of diabetes type II (uncontrolled, on insulin and oral agents), obesity, chronic smoking, hypertension, hyperlipidemia, and FHx CAD. \par There is no prior history of known CAD, MI, CHF, AF, CVA. \par \par Denies any symptoms of exertional chest pain or discomfort. Denies unusual shortness of breath, orthopnea, weight gain, or LE edema. Denies palpitations, lightheadedness, dizziness, or syncope.  \par \par He is motivated to quit smoking and plans to request Chantix from PCP. \par \par ETT 11/2019 He performed 7 min 9 sec of Rj protocol which is fair. 9.7 METS. Stopped d/t LE fatigue. Achieved 87% MPHR. Peak /92. No chest pain. \par \par Echo 12/2019 , overall Normal heart structure and valvular function. No changes. \par \par EKG reveals IRBBB and nonspecific ST-T wave abnormalities.\par \par Labs from 10/2019. AST 18, ALT 30, alk phos 147, HbA1c 11.8, K 4.6, creat 0.88, , HDL 28, trigs 581 ---> Labs Jan 2020 LDL 60 on crestor, normal LFTs, trigs 254, HbA1c 7.4\par \par Abd US 5/2019 revealed no significant plaque and no aneurysm. \par

## 2020-06-15 NOTE — PHYSICAL EXAM
[General Appearance - Well Developed] : well developed [Well Groomed] : well groomed [Normal Appearance] : normal appearance [No Deformities] : no deformities [General Appearance - Well Nourished] : well nourished [General Appearance - In No Acute Distress] : no acute distress [FreeTextEntry1] : no JVD, no bruit [Respiration, Rhythm And Depth] : normal respiratory rhythm and effort [Auscultation Breath Sounds / Voice Sounds] : lungs were clear to auscultation bilaterally [Exaggerated Use Of Accessory Muscles For Inspiration] : no accessory muscle use [Heart Rate And Rhythm] : heart rate and rhythm were normal [Heart Sounds] : normal S1 and S2 [Murmurs] : no murmurs present [Edema] : no peripheral edema present [Abdomen Soft] : soft [Abdomen Tenderness] : non-tender [Abnormal Walk] : normal gait [Gait - Sufficient For Exercise Testing] : the gait was sufficient for exercise testing [Cyanosis, Localized] : no localized cyanosis [Nail Clubbing] : no clubbing of the fingernails [Petechial Hemorrhages (___cm)] : no petechial hemorrhages [Skin Color & Pigmentation] : normal skin color and pigmentation [] : no rash [No Skin Ulcers] : no skin ulcer [Skin Lesions] : no skin lesions [No Venous Stasis] : no venous stasis [No Xanthoma] : no  xanthoma was observed [Oriented To Time, Place, And Person] : oriented to person, place, and time [Mood] : the mood was normal [Affect] : the affect was normal [No Anxiety] : not feeling anxious

## 2020-06-30 ENCOUNTER — RX CHANGE (OUTPATIENT)
Age: 35
End: 2020-06-30

## 2020-07-13 ENCOUNTER — RX RENEWAL (OUTPATIENT)
Age: 35
End: 2020-07-13

## 2020-07-22 ENCOUNTER — RX CHANGE (OUTPATIENT)
Age: 35
End: 2020-07-22

## 2020-07-31 ENCOUNTER — RX RENEWAL (OUTPATIENT)
Age: 35
End: 2020-07-31

## 2020-08-03 ENCOUNTER — APPOINTMENT (OUTPATIENT)
Dept: INTERNAL MEDICINE | Facility: CLINIC | Age: 35
End: 2020-08-03
Payer: COMMERCIAL

## 2020-08-03 VITALS
DIASTOLIC BLOOD PRESSURE: 90 MMHG | HEIGHT: 74 IN | OXYGEN SATURATION: 97 % | TEMPERATURE: 99.3 F | SYSTOLIC BLOOD PRESSURE: 140 MMHG | BODY MASS INDEX: 36.32 KG/M2 | HEART RATE: 83 BPM | WEIGHT: 283 LBS

## 2020-08-03 PROCEDURE — 99214 OFFICE O/P EST MOD 30 MIN: CPT

## 2020-08-03 RX ORDER — AMOXICILLIN AND CLAVULANATE POTASSIUM 875; 125 MG/1; MG/1
875-125 TABLET, COATED ORAL
Qty: 20 | Refills: 0 | Status: COMPLETED | COMMUNITY
Start: 2020-05-26

## 2020-08-03 RX ORDER — ACETAMINOPHEN 325 MG/1
325 TABLET ORAL
Qty: 60 | Refills: 0 | Status: COMPLETED | COMMUNITY
Start: 2020-05-27

## 2020-08-03 RX ORDER — AMOXICILLIN AND CLAVULANATE POTASSIUM 500; 125 MG/1; MG/1
500-125 TABLET, FILM COATED ORAL
Qty: 10 | Refills: 0 | Status: COMPLETED | COMMUNITY
Start: 2020-05-27

## 2020-08-03 NOTE — HISTORY OF PRESENT ILLNESS
[FreeTextEntry1] : 34 y/o male present today for a f/u visit and med renewals.  [de-identified] : Pt presents to the office today for follow up.   He has recently seen Cardiology and he was increased on his Metoprolol.  He has upcoming appointment with Endo\par His diet has been poor lately\par He is back to smoking 1 PPD

## 2020-08-03 NOTE — PLAN
[FreeTextEntry1] : Discussed smoking cessation with pt.  He has been on Chantix in the past with good results and no side effects.\par He will further improve diet and exercise \par Continue with current medication\par Follow up with me in 1 month by phone to let me know how he is doing on Chantix or before then if needed.\par He has Endo appointment on 9/18/2020\par He is over due for FBW and I will give him rx to have outside labs as he is not fasting today.  \par He will follow up in office with me in 6 months

## 2020-09-18 ENCOUNTER — RESULT CHARGE (OUTPATIENT)
Age: 35
End: 2020-09-18

## 2020-09-18 ENCOUNTER — APPOINTMENT (OUTPATIENT)
Dept: ENDOCRINOLOGY | Facility: CLINIC | Age: 35
End: 2020-09-18
Payer: COMMERCIAL

## 2020-09-18 VITALS
SYSTOLIC BLOOD PRESSURE: 124 MMHG | HEART RATE: 105 BPM | HEIGHT: 75 IN | WEIGHT: 280 LBS | BODY MASS INDEX: 34.82 KG/M2 | OXYGEN SATURATION: 98 % | DIASTOLIC BLOOD PRESSURE: 80 MMHG

## 2020-09-18 LAB — GLUCOSE BLDC GLUCOMTR-MCNC: 179

## 2020-09-18 PROCEDURE — 82962 GLUCOSE BLOOD TEST: CPT

## 2020-09-18 PROCEDURE — 99214 OFFICE O/P EST MOD 30 MIN: CPT | Mod: 25

## 2020-09-18 NOTE — PHYSICAL EXAM
[Alert] : alert [Well Nourished] : well nourished [No Acute Distress] : no acute distress [Normal Sclera/Conjunctiva] : normal sclera/conjunctiva [Normal Hearing] : hearing was normal [Thyroid Not Enlarged] : the thyroid was not enlarged [No Accessory Muscle Use] : no accessory muscle use [Clear to Auscultation] : lungs were clear to auscultation bilaterally [Normal S1, S2] : normal S1 and S2 [Normal Rate] : heart rate was normal [No Edema] : no peripheral edema [Not Tender] : non-tender [Soft] : abdomen soft [Normal Gait] : normal gait [No Tremors] : no tremors [Oriented x3] : oriented to person, place, and time

## 2020-09-18 NOTE — HISTORY OF PRESENT ILLNESS
[FreeTextEntry1] : Pt admits to overeating/drinking some more during pandemic/social isolation\par \par T2DM\par Severity: uncontrolled\par Onset: routine labs\par Duration: 10 years ago \par Modifying Factors: worse with diet \par \par SMBG\par Has not been checking BS \par  in office -currently fasting\par \par Current drug regimen\par Lantus 16 units\par  mg 2 tabs BID\par \par Eye exam: end of 2019, denies DR \par Foot exam: does not see podiatry- denies numbness/tingling in b/l feet\par Kidney disease: denies- has had kidney stone in the past\par Heart disease: denies- just saw cardiology , did full work up- all negative \par \par Weight: stable \par Diet: wife cooks- tries not to eat out a lot \par B: biscuit cookies\par L: left overs, salad\par D: crockpot meals, chicken/rice, chili \par S: goldfish\par Exercise: works hard at work and just starting to work out again at work\par Smoking : pack a day- motivated to quit , on chantix now \par \par HTN\par /80\par Metoprolol 50 mg daily\par Losartan 100 mg daily\par \par High Triglycerides/HLD\par Need updated levels\par Rosuvastatin 10 mg daily \par Triglycerides most likely high due to high sugars \par \par +Smoker

## 2020-09-18 NOTE — ASSESSMENT
[FreeTextEntry1] : T2DM\par -Continue  mg 2 tabs BID\par -Continue Lantus 16 units- needs a refill but also likely needs an increase so will wait for labs to come back\par -Restart FS to 2x a day\par -May benefit from GLP, pt to call insurance company to let us know which is covered\par -Continue to follow up with all specialists including ophtho, podiatry (ENT, DERM, and GI also per pt)\par -Increase exercise as tolerated, goal of 30 mins a day 5x a week\par -Labs today,will call with results\par \par HLD\par Continue statin\par Labs today, will call with results\par \par \par HTN\par Continue ARB and beta blocker as per cardiology \par \par Tobacco abuse\par Motivated to quit, back on chantix\par \par RTO in 3 months

## 2020-09-18 NOTE — REVIEW OF SYSTEMS
[Nocturia] : nocturia [Fatigue] : no fatigue [Recent Weight Gain (___ Lbs)] : no recent weight gain [Recent Weight Loss (___ Lbs)] : no recent weight loss [Visual Field Defect] : no visual field defect [Blurred Vision] : no blurred vision [Dysphagia] : no dysphagia [Neck Pain] : no neck pain [Dysphonia] : no dysphonia [Chest Pain] : no chest pain [Palpitations] : no palpitations [Shortness Of Breath] : no shortness of breath [Nausea] : no nausea [Constipation] : no constipation [Vomiting] : no vomiting [Diarrhea] : no diarrhea [Polyuria] : no polyuria [Headaches] : no headaches [Polydipsia] : no polydipsia [FreeTextEntry4] : currently on abx for sinus infection

## 2020-09-21 ENCOUNTER — RX CHANGE (OUTPATIENT)
Age: 35
End: 2020-09-21

## 2020-09-21 LAB
25(OH)D3 SERPL-MCNC: 44.7 NG/ML
ALBUMIN SERPL ELPH-MCNC: 4.7 G/DL
ALP BLD-CCNC: 89 U/L
ALT SERPL-CCNC: 30 U/L
ANION GAP SERPL CALC-SCNC: 17 MMOL/L
AST SERPL-CCNC: 20 U/L
BASOPHILS # BLD AUTO: 0.07 K/UL
BASOPHILS NFR BLD AUTO: 0.6 %
BILIRUB SERPL-MCNC: 0.4 MG/DL
BUN SERPL-MCNC: 17 MG/DL
CALCIUM SERPL-MCNC: 10.3 MG/DL
CHLORIDE SERPL-SCNC: 98 MMOL/L
CHOLEST SERPL-MCNC: 111 MG/DL
CHOLEST/HDLC SERPL: 3.6 RATIO
CO2 SERPL-SCNC: 25 MMOL/L
CREAT SERPL-MCNC: 0.9 MG/DL
CREAT SPEC-SCNC: 188 MG/DL
EOSINOPHIL # BLD AUTO: 0.56 K/UL
EOSINOPHIL NFR BLD AUTO: 4.6 %
ESTIMATED AVERAGE GLUCOSE: 166 MG/DL
GLUCOSE SERPL-MCNC: 170 MG/DL
HBA1C MFR BLD HPLC: 7.4 %
HCT VFR BLD CALC: 50.5 %
HDLC SERPL-MCNC: 31 MG/DL
HGB BLD-MCNC: 16.3 G/DL
IMM GRANULOCYTES NFR BLD AUTO: 0.2 %
LDLC SERPL CALC-MCNC: 48 MG/DL
LYMPHOCYTES # BLD AUTO: 2.58 K/UL
LYMPHOCYTES NFR BLD AUTO: 21.1 %
MAN DIFF?: NORMAL
MCHC RBC-ENTMCNC: 29.4 PG
MCHC RBC-ENTMCNC: 32.3 GM/DL
MCV RBC AUTO: 91.2 FL
MICROALBUMIN 24H UR DL<=1MG/L-MCNC: 2.1 MG/DL
MICROALBUMIN/CREAT 24H UR-RTO: 11 MG/G
MONOCYTES # BLD AUTO: 1.01 K/UL
MONOCYTES NFR BLD AUTO: 8.3 %
NEUTROPHILS # BLD AUTO: 7.95 K/UL
NEUTROPHILS NFR BLD AUTO: 65.2 %
PLATELET # BLD AUTO: 228 K/UL
POTASSIUM SERPL-SCNC: 5 MMOL/L
PROT SERPL-MCNC: 6.9 G/DL
RBC # BLD: 5.54 M/UL
RBC # FLD: 12.7 %
SODIUM SERPL-SCNC: 141 MMOL/L
T3FREE SERPL-MCNC: 3.45 PG/ML
T4 FREE SERPL-MCNC: 1.5 NG/DL
TRIGL SERPL-MCNC: 161 MG/DL
TSH SERPL-ACNC: 1.29 UIU/ML
VIT B12 SERPL-MCNC: 1193 PG/ML
WBC # FLD AUTO: 12.2 K/UL

## 2020-12-16 PROBLEM — Z87.440 HISTORY OF URINARY TRACT INFECTION: Status: RESOLVED | Noted: 2018-07-17 | Resolved: 2020-12-16

## 2020-12-17 ENCOUNTER — TRANSCRIPTION ENCOUNTER (OUTPATIENT)
Age: 35
End: 2020-12-17

## 2020-12-17 ENCOUNTER — APPOINTMENT (OUTPATIENT)
Dept: ENDOCRINOLOGY | Facility: CLINIC | Age: 35
End: 2020-12-17
Payer: COMMERCIAL

## 2020-12-17 PROCEDURE — 99214 OFFICE O/P EST MOD 30 MIN: CPT | Mod: 95

## 2020-12-17 NOTE — HISTORY OF PRESENT ILLNESS
[Home] : at home, [unfilled] , at the time of the visit. [Other Location: e.g. Home (Enter Location, City,State)___] : at [unfilled] [Verbal consent obtained from patient] : the patient, [unfilled] [FreeTextEntry1] : Pt admits to overeating/drinking some more during pandemic/social isolation\par \par T2DM\par Severity: uncontrolled\par Onset: routine labs\par Duration: 10 years ago \par Modifying Factors: worse with diet \par \par SMBG\par Has not been checking BS regularly\par On average 175\par \par Current drug regimen\par Lantus 16 units\par  mg 2 tabs BID\par \par Has Ozempic in his refrigerator but has not been able to start it  \par \par Eye exam: end of 2019, denies DR \par Foot exam: does not see podiatry- denies numbness/tingling in b/l feet\par Kidney disease: denies- has had kidney stone in the past\par Heart disease: denies- just saw cardiology , did full work up- all negative \par \par Weight: stable \par Diet: wife cooks- tries not to eat out a lot \par has been late night snacking\par B: biscuit cookies\par L: left overs, salad\par D: crockpot meals, chicken/rice, chili \par S: goldfish\par Exercise: works hard at work, goes for daily walks\par Smoking : pack a day- motivated to quit , the chantix did not work\par \par HTN\par No updated BP levels\par Metoprolol 50 mg daily\par Losartan 100 mg daily\par \par High Triglycerides/HLD\par Need updated levels\par Rosuvastatin 10 mg daily \par Triglycerides most likely high due to high sugars \par \par +Smoker

## 2020-12-17 NOTE — REVIEW OF SYSTEMS
[Fatigue] : no fatigue [Recent Weight Gain (___ Lbs)] : no recent weight gain [Recent Weight Loss (___ Lbs)] : no recent weight loss [Visual Field Defect] : visual field defect [Blurred Vision] : blurred vision [Chest Pain] : no chest pain [Shortness Of Breath] : no shortness of breath [Constipation] : no constipation [Diarrhea] : no diarrhea [Polyuria] : no polyuria [Polydipsia] : no polydipsia [FreeTextEntry3] : at night, right eye blurry vision

## 2020-12-17 NOTE — ASSESSMENT
[FreeTextEntry1] : T2DM\par -Continue  mg 2 tabs BID\par -Continue Lantus 16 units\par -Pt will begin Ozempic 0.25 mg weekly, he will do this for 4 weeks and then increase to 0.5 mg weekly \par -Restart FS to 2x a day, send in logs every 2 weeks\par -Continue to follow up with all specialists including ophtho, podiatry (ENT, DERM, and GI also per pt)\par -Increase exercise as tolerated, goal of 30 mins a day 5x a week\par -Lab rx to be mailed home, call with results\par \par HLD\par Continue statin\par Labs to be done, will call with results\par \par \par HTN\par Continue ARB and beta blocker as per cardiology \par \par Tobacco abuse\par Motivated to quit\par \par RTO in 3 months

## 2020-12-28 ENCOUNTER — APPOINTMENT (OUTPATIENT)
Dept: CARDIOLOGY | Facility: CLINIC | Age: 35
End: 2020-12-28

## 2021-01-25 ENCOUNTER — RX RENEWAL (OUTPATIENT)
Age: 36
End: 2021-01-25

## 2021-02-05 ENCOUNTER — APPOINTMENT (OUTPATIENT)
Dept: INTERNAL MEDICINE | Facility: CLINIC | Age: 36
End: 2021-02-05
Payer: COMMERCIAL

## 2021-02-05 VITALS
SYSTOLIC BLOOD PRESSURE: 140 MMHG | DIASTOLIC BLOOD PRESSURE: 100 MMHG | HEIGHT: 75 IN | WEIGHT: 273 LBS | HEART RATE: 89 BPM | TEMPERATURE: 98.5 F | BODY MASS INDEX: 33.94 KG/M2 | OXYGEN SATURATION: 98 %

## 2021-02-05 VITALS — SYSTOLIC BLOOD PRESSURE: 126 MMHG | DIASTOLIC BLOOD PRESSURE: 82 MMHG

## 2021-02-05 DIAGNOSIS — Z87.898 PERSONAL HISTORY OF OTHER SPECIFIED CONDITIONS: ICD-10-CM

## 2021-02-05 DIAGNOSIS — Z87.39 PERSONAL HISTORY OF OTHER DISEASES OF THE MUSCULOSKELETAL SYSTEM AND CONNECTIVE TISSUE: ICD-10-CM

## 2021-02-05 DIAGNOSIS — Z01.818 ENCOUNTER FOR OTHER PREPROCEDURAL EXAMINATION: ICD-10-CM

## 2021-02-05 DIAGNOSIS — Z13.21 ENCOUNTER FOR SCREENING FOR NUTRITIONAL DISORDER: ICD-10-CM

## 2021-02-05 DIAGNOSIS — Z13.29 ENCOUNTER FOR SCREENING FOR OTHER SUSPECTED ENDOCRINE DISORDER: ICD-10-CM

## 2021-02-05 DIAGNOSIS — R10.11 RIGHT UPPER QUADRANT PAIN: ICD-10-CM

## 2021-02-05 DIAGNOSIS — Z13.220 ENCOUNTER FOR SCREENING FOR LIPOID DISORDERS: ICD-10-CM

## 2021-02-05 DIAGNOSIS — Z71.6 TOBACCO ABUSE COUNSELING: ICD-10-CM

## 2021-02-05 DIAGNOSIS — Z13.0 ENCOUNTER FOR SCREENING FOR OTHER SUSPECTED ENDOCRINE DISORDER: ICD-10-CM

## 2021-02-05 DIAGNOSIS — Z13.228 ENCOUNTER FOR SCREENING FOR OTHER SUSPECTED ENDOCRINE DISORDER: ICD-10-CM

## 2021-02-05 PROCEDURE — 36415 COLL VENOUS BLD VENIPUNCTURE: CPT

## 2021-02-05 PROCEDURE — 99072 ADDL SUPL MATRL&STAF TM PHE: CPT

## 2021-02-05 PROCEDURE — 99214 OFFICE O/P EST MOD 30 MIN: CPT | Mod: 25

## 2021-02-05 RX ORDER — VARENICLINE TARTRATE 0.5 (11)-1
0.5 MG X 11 & KIT ORAL
Qty: 1 | Refills: 0 | Status: COMPLETED | COMMUNITY
Start: 2020-08-03 | End: 2021-02-05

## 2021-02-05 NOTE — REVIEW OF SYSTEMS
[Negative] : Heme/Lymph [FreeTextEntry4] : Sinus pressure and pain [FreeTextEntry9] : Right upper leg and knee pain intermittent

## 2021-02-05 NOTE — PLAN
[FreeTextEntry1] : Patient will make appointment with physical therapy to help with further stretching of right lower extremity and lower back\par We will continue with current medications\par Blood work done in office today\par She is still currently smoking feel treatment with Chantix\par Patient given multiple supports for smoking cessation patient will look into acupuncture [facility] smoking cessation and even laser therapy\par We will follow-up again with me in 6 months

## 2021-02-05 NOTE — HISTORY OF PRESENT ILLNESS
[FreeTextEntry1] : 36 y/o male present today for a 6 mo f/u visit with fasting labs.  [de-identified] : Presents the office today for follow-up and fasting blood work\par He has been feeling well overall only a few minor complaints\par He has been having sinus pressure and pain intermittently for the last few weeks taking over-the-counter Mucinex and nasal spray with little improvement at times\par He has been having right knee and hamstring tightness especially when driving\par Diet has improved and patient has been seeing endocrinology for diabetic care\par Patient is stretching and doing exercise often\par

## 2021-02-05 NOTE — PHYSICAL EXAM
[Normal Outer Ear/Nose] : the outer ears and nose were normal in appearance [Normal Oropharynx] : the oropharynx was normal [Normal TMs] : both tympanic membranes were normal [Normal] : no posterior cervical lymphadenopathy and no anterior cervical lymphadenopathy [de-identified] : Frontal sinus tenderness palpation.  Nasal mucosa erythema

## 2021-02-19 ENCOUNTER — TRANSCRIPTION ENCOUNTER (OUTPATIENT)
Age: 36
End: 2021-02-19

## 2021-02-19 LAB
ALBUMIN SERPL ELPH-MCNC: 4.7 G/DL
ALP BLD-CCNC: 92 U/L
ALT SERPL-CCNC: 41 U/L
ANION GAP SERPL CALC-SCNC: 12 MMOL/L
AST SERPL-CCNC: 28 U/L
BASOPHILS # BLD AUTO: 0.05 K/UL
BASOPHILS NFR BLD AUTO: 0.6 %
BILIRUB SERPL-MCNC: 0.2 MG/DL
BUN SERPL-MCNC: 14 MG/DL
CALCIUM SERPL-MCNC: 9.7 MG/DL
CHLORIDE SERPL-SCNC: 102 MMOL/L
CHOLEST SERPL-MCNC: 109 MG/DL
CO2 SERPL-SCNC: 24 MMOL/L
CREAT SERPL-MCNC: 0.98 MG/DL
CREAT SPEC-SCNC: 159 MG/DL
EOSINOPHIL # BLD AUTO: 0.9 K/UL
EOSINOPHIL NFR BLD AUTO: 10.1 %
ESTIMATED AVERAGE GLUCOSE: 154 MG/DL
GLUCOSE SERPL-MCNC: 112 MG/DL
HBA1C MFR BLD HPLC: 7 %
HCT VFR BLD CALC: 51.9 %
HDLC SERPL-MCNC: 31 MG/DL
HGB BLD-MCNC: 16.9 G/DL
IMM GRANULOCYTES NFR BLD AUTO: 0.2 %
LDLC SERPL CALC-MCNC: 52 MG/DL
LYMPHOCYTES # BLD AUTO: 2.66 K/UL
LYMPHOCYTES NFR BLD AUTO: 29.9 %
MAN DIFF?: NORMAL
MCHC RBC-ENTMCNC: 29.8 PG
MCHC RBC-ENTMCNC: 32.6 GM/DL
MCV RBC AUTO: 91.4 FL
MICROALBUMIN 24H UR DL<=1MG/L-MCNC: <1.2 MG/DL
MICROALBUMIN/CREAT 24H UR-RTO: NORMAL MG/G
MONOCYTES # BLD AUTO: 0.67 K/UL
MONOCYTES NFR BLD AUTO: 7.5 %
NEUTROPHILS # BLD AUTO: 4.59 K/UL
NEUTROPHILS NFR BLD AUTO: 51.7 %
NONHDLC SERPL-MCNC: 78 MG/DL
PLATELET # BLD AUTO: 216 K/UL
POTASSIUM SERPL-SCNC: 4.6 MMOL/L
PROT SERPL-MCNC: 6.7 G/DL
RBC # BLD: 5.68 M/UL
RBC # FLD: 13 %
SODIUM SERPL-SCNC: 138 MMOL/L
TRIGL SERPL-MCNC: 130 MG/DL
WBC # FLD AUTO: 8.89 K/UL

## 2021-03-16 ENCOUNTER — TRANSCRIPTION ENCOUNTER (OUTPATIENT)
Age: 36
End: 2021-03-16

## 2021-03-19 ENCOUNTER — APPOINTMENT (OUTPATIENT)
Dept: ENDOCRINOLOGY | Facility: CLINIC | Age: 36
End: 2021-03-19
Payer: COMMERCIAL

## 2021-03-19 VITALS
HEART RATE: 85 BPM | OXYGEN SATURATION: 99 % | HEIGHT: 75 IN | DIASTOLIC BLOOD PRESSURE: 92 MMHG | BODY MASS INDEX: 33.57 KG/M2 | WEIGHT: 270 LBS | SYSTOLIC BLOOD PRESSURE: 138 MMHG

## 2021-03-19 LAB — GLUCOSE BLDC GLUCOMTR-MCNC: 96

## 2021-03-19 PROCEDURE — 99072 ADDL SUPL MATRL&STAF TM PHE: CPT

## 2021-03-19 PROCEDURE — 82962 GLUCOSE BLOOD TEST: CPT

## 2021-03-19 PROCEDURE — 99214 OFFICE O/P EST MOD 30 MIN: CPT | Mod: 25

## 2021-03-19 NOTE — ASSESSMENT
[FreeTextEntry1] : T2DM\par -Continue  mg 2 tabs BID\par -Continue Lantus 14 units, begin titrating down by 2 units every week as long as fasting remains under 140\par -Continue Ozempic 0.5 mg weekly\par -Restart FS to 2x a day, send in logs every 2 weeks\par -Continue to follow up with all specialists including ophtho, podiatry (ENT, DERM, and GI also per pt)\par -Increase exercise as tolerated, goal of 30 mins a day 5x a week\par -Lab rx to be mailed home, call with results\par \par HLD\par Continue statin, lipid panel improved\par \par HTN\par Continue ARB and beta blocker as per cardiology \par \par Tobacco abuse\par Motivated to quit\par \par RTO in 3 months, fasting labs before next visit

## 2021-03-19 NOTE — HISTORY OF PRESENT ILLNESS
[FreeTextEntry1] : T2DM\par Severity: uncontrolled\par Onset: routine labs\par Duration: 10 years ago \par Modifying Factors: worse with diet \par \par SMBG\par Checks fasting in AM low 100s\par FS in office 96\par \par HGA1C 7.0\par \par Current drug regimen\par Lantus 14 units QHS\par  mg BID\par Ozempic 0.5 mg weekly\par \par Eye exam: end of 2019, denies DR \par Foot exam: does not see podiatry- denies numbness/tingling in b/l feet\par Kidney disease: denies- has had kidney stone in the past\par Heart disease: denies- just saw cardiology , did full work up- all negative \par \par Weight: down 10 lbs since summer \par Diet: wife cooks- tries not to eat out a lot \par has been late night snacking\par B: biscuit cookies\par L: left overs, salad\par D: crockpot meals, chicken/rice, chili \par S: goldfish\par Exercise: works hard at work, goes for daily walks\par Smoking : pack a day- motivated to quit , the chantix did not work\par \par HTN\par 138/92\par Metoprolol 50 mg daily\par Losartan 100 mg daily\par \par High Triglycerides/HLD\par Triglyceride 130, Total cholesterol 109, HDL 31, LDL 52\par Rosuvastatin 10 mg daily \par \par +Smoker

## 2021-03-19 NOTE — REVIEW OF SYSTEMS
[Recent Weight Loss (___ Lbs)] : recent weight loss: [unfilled] lbs [Polyuria] : polyuria [Fatigue] : no fatigue [Recent Weight Gain (___ Lbs)] : no recent weight gain [Visual Field Defect] : no visual field defect [Blurred Vision] : no blurred vision [Chest Pain] : no chest pain [Shortness Of Breath] : no shortness of breath [Constipation] : no constipation [Diarrhea] : no diarrhea [Polydipsia] : no polydipsia [FreeTextEntry8] : drinks a lot of water

## 2021-04-19 ENCOUNTER — TRANSCRIPTION ENCOUNTER (OUTPATIENT)
Age: 36
End: 2021-04-19

## 2021-04-26 ENCOUNTER — RX RENEWAL (OUTPATIENT)
Age: 36
End: 2021-04-26

## 2021-05-06 ENCOUNTER — RX RENEWAL (OUTPATIENT)
Age: 36
End: 2021-05-06

## 2021-06-22 NOTE — PHYSICAL EXAM
[Alert] : alert [No Acute Distress] : no acute distress [Well Nourished] : well nourished [Well Developed] : well developed [Normal Sclera/Conjunctiva] : normal sclera/conjunctiva [Normal Hearing] : hearing was normal [Supple] : the neck was supple [Normal Rate and Effort] : normal respiratory rhythm and effort [No Accessory Muscle Use] : no accessory muscle use [Clear to Auscultation] : lungs were clear to auscultation bilaterally [Normal Rate] : heart rate was normal  [Normal S1, S2] : normal S1 and S2 [Regular Rhythm] : with a regular rhythm [No Edema] : there was no peripheral edema [Not Tender] : non-tender [Soft] : abdomen soft [Post Cervical Nodes] : posterior cervical nodes [Anterior Cervical Nodes] : anterior cervical nodes [Normal] : normal and non tender [Normal Gait] : normal gait [No Rash] : no rash [Acanthosis Nigricans] : no acanthosis nigricans [No Tremors] : no tremors [Oriented x3] : oriented to person, place, and time Estimated Blood Loss (Cc): minimal

## 2021-06-24 ENCOUNTER — APPOINTMENT (OUTPATIENT)
Dept: ENDOCRINOLOGY | Facility: CLINIC | Age: 36
End: 2021-06-24

## 2021-08-01 ENCOUNTER — RX RENEWAL (OUTPATIENT)
Age: 36
End: 2021-08-01

## 2021-08-16 ENCOUNTER — APPOINTMENT (OUTPATIENT)
Dept: INTERNAL MEDICINE | Facility: CLINIC | Age: 36
End: 2021-08-16

## 2021-09-13 ENCOUNTER — APPOINTMENT (OUTPATIENT)
Dept: CARDIOLOGY | Facility: CLINIC | Age: 36
End: 2021-09-13
Payer: COMMERCIAL

## 2021-09-13 VITALS
WEIGHT: 274 LBS | SYSTOLIC BLOOD PRESSURE: 130 MMHG | BODY MASS INDEX: 34.07 KG/M2 | DIASTOLIC BLOOD PRESSURE: 82 MMHG | HEIGHT: 75 IN | TEMPERATURE: 98.2 F | OXYGEN SATURATION: 99 % | HEART RATE: 79 BPM

## 2021-09-13 DIAGNOSIS — Z87.09 PERSONAL HISTORY OF OTHER DISEASES OF THE RESPIRATORY SYSTEM: ICD-10-CM

## 2021-09-13 DIAGNOSIS — M79.604 PAIN IN RIGHT LEG: ICD-10-CM

## 2021-09-13 DIAGNOSIS — F17.200 NICOTINE DEPENDENCE, UNSPECIFIED, UNCOMPLICATED: ICD-10-CM

## 2021-09-13 DIAGNOSIS — Z13.31 ENCOUNTER FOR SCREENING FOR DEPRESSION: ICD-10-CM

## 2021-09-13 DIAGNOSIS — N20.0 CALCULUS OF KIDNEY: ICD-10-CM

## 2021-09-13 DIAGNOSIS — M50.20 OTHER CERVICAL DISC DISPLACEMENT, UNSPECIFIED CERVICAL REGION: ICD-10-CM

## 2021-09-13 DIAGNOSIS — Z87.891 PERSONAL HISTORY OF NICOTINE DEPENDENCE: ICD-10-CM

## 2021-09-13 DIAGNOSIS — R79.9 ABNORMAL FINDING OF BLOOD CHEMISTRY, UNSPECIFIED: ICD-10-CM

## 2021-09-13 DIAGNOSIS — M25.561 PAIN IN RIGHT KNEE: ICD-10-CM

## 2021-09-13 PROCEDURE — 93000 ELECTROCARDIOGRAM COMPLETE: CPT

## 2021-09-13 PROCEDURE — 99214 OFFICE O/P EST MOD 30 MIN: CPT

## 2021-09-13 RX ORDER — VARENICLINE TARTRATE 1 MG/1
1 TABLET, FILM COATED ORAL
Qty: 1 | Refills: 1 | Status: DISCONTINUED | COMMUNITY
Start: 2020-08-27 | End: 2021-09-13

## 2021-09-13 RX ORDER — ROSUVASTATIN CALCIUM 10 MG/1
10 TABLET, FILM COATED ORAL
Qty: 90 | Refills: 1 | Status: DISCONTINUED | COMMUNITY
Start: 2019-11-14 | End: 2021-09-13

## 2021-09-13 RX ORDER — AMOXICILLIN 875 MG/1
875 TABLET, FILM COATED ORAL
Qty: 20 | Refills: 0 | Status: DISCONTINUED | COMMUNITY
Start: 2021-02-05 | End: 2021-09-13

## 2021-10-18 ENCOUNTER — RX RENEWAL (OUTPATIENT)
Age: 36
End: 2021-10-18

## 2021-10-20 ENCOUNTER — TRANSCRIPTION ENCOUNTER (OUTPATIENT)
Age: 36
End: 2021-10-20

## 2021-10-21 ENCOUNTER — TRANSCRIPTION ENCOUNTER (OUTPATIENT)
Age: 36
End: 2021-10-21

## 2021-10-27 ENCOUNTER — APPOINTMENT (OUTPATIENT)
Dept: INTERNAL MEDICINE | Facility: CLINIC | Age: 36
End: 2021-10-27

## 2022-01-10 ENCOUNTER — RX RENEWAL (OUTPATIENT)
Age: 37
End: 2022-01-10

## 2022-02-14 ENCOUNTER — TRANSCRIPTION ENCOUNTER (OUTPATIENT)
Age: 37
End: 2022-02-14

## 2022-02-21 ENCOUNTER — NON-APPOINTMENT (OUTPATIENT)
Age: 37
End: 2022-02-21

## 2022-03-18 ENCOUNTER — APPOINTMENT (OUTPATIENT)
Dept: INTERNAL MEDICINE | Facility: CLINIC | Age: 37
End: 2022-03-18
Payer: COMMERCIAL

## 2022-03-18 VITALS
HEART RATE: 100 BPM | TEMPERATURE: 98 F | SYSTOLIC BLOOD PRESSURE: 140 MMHG | WEIGHT: 300 LBS | BODY MASS INDEX: 37.3 KG/M2 | OXYGEN SATURATION: 98 % | HEIGHT: 75 IN | DIASTOLIC BLOOD PRESSURE: 96 MMHG

## 2022-03-18 VITALS — DIASTOLIC BLOOD PRESSURE: 90 MMHG | SYSTOLIC BLOOD PRESSURE: 138 MMHG

## 2022-03-18 DIAGNOSIS — Z87.891 PERSONAL HISTORY OF NICOTINE DEPENDENCE: ICD-10-CM

## 2022-03-18 DIAGNOSIS — R74.8 ABNORMAL LEVELS OF OTHER SERUM ENZYMES: ICD-10-CM

## 2022-03-18 DIAGNOSIS — F17.200 NICOTINE DEPENDENCE, UNSPECIFIED, UNCOMPLICATED: ICD-10-CM

## 2022-03-18 PROCEDURE — 36415 COLL VENOUS BLD VENIPUNCTURE: CPT

## 2022-03-18 PROCEDURE — 99395 PREV VISIT EST AGE 18-39: CPT | Mod: 25

## 2022-03-18 NOTE — HEALTH RISK ASSESSMENT
[Good] : ~his/her~  mood as  good [No falls in past year] : Patient reported no falls in the past year [Change in mental status noted] : No change in mental status noted [Language] : denies difficulty with language [Behavior] : denies difficulty with behavior [None] : None [Fully functional (bathing, dressing, toileting, transferring, walking, feeding)] : Fully functional (bathing, dressing, toileting, transferring, walking, feeding) [Fully functional (using the telephone, shopping, preparing meals, housekeeping, doing laundry, using] : Fully functional and needs no help or supervision to perform IADLs (using the telephone, shopping, preparing meals, housekeeping, doing laundry, using transportation, managing medications and managing finances) [Reports changes in hearing] : Reports no changes in hearing [Reports changes in vision] : Reports no changes in vision [Reports normal functional visual acuity (ie: able to read med bottle)] : Reports normal functional visual acuity [Reports changes in dental health] : Reports no changes in dental health

## 2022-03-18 NOTE — HISTORY OF PRESENT ILLNESS
[FreeTextEntry1] : 36 y/o male presents to the office today for a physical exam with fasting labs.  [de-identified] : Pt presents to the office today for CPE with FBW\par Pt just restarted diet and exercise\par Pt has not been taking Losartan or Metformin \par Has not recently followed up with Endo and Cardio but is making appointment

## 2022-03-18 NOTE — PLAN
[FreeTextEntry1] : Pt will restart Losartan and Metformin\par Pt will start Metformin 1 pill daily for 2 weeks and increased every 2 weeks by 1 pill until he is back on 2 pills twice daily.\par restart Diet and exercise\par Follow up with Endo and Cardio

## 2022-03-28 ENCOUNTER — TRANSCRIPTION ENCOUNTER (OUTPATIENT)
Age: 37
End: 2022-03-28

## 2022-03-28 LAB
ALBUMIN SERPL ELPH-MCNC: 4.8 G/DL
ALP BLD-CCNC: 115 U/L
ALT SERPL-CCNC: 50 U/L
ANION GAP SERPL CALC-SCNC: 13 MMOL/L
APPEARANCE: CLEAR
AST SERPL-CCNC: 30 U/L
BACTERIA: NEGATIVE
BASOPHILS # BLD AUTO: 0.05 K/UL
BASOPHILS NFR BLD AUTO: 0.5 %
BILIRUB SERPL-MCNC: 0.6 MG/DL
BILIRUBIN URINE: NEGATIVE
BLOOD URINE: NEGATIVE
BUN SERPL-MCNC: 17 MG/DL
CALCIUM SERPL-MCNC: 9.9 MG/DL
CHLORIDE SERPL-SCNC: 99 MMOL/L
CHOLEST SERPL-MCNC: 225 MG/DL
CO2 SERPL-SCNC: 26 MMOL/L
COLOR: NORMAL
CREAT SERPL-MCNC: 1 MG/DL
CREAT SPEC-SCNC: 66 MG/DL
EGFR: 99 ML/MIN/1.73M2
EOSINOPHIL # BLD AUTO: 0.47 K/UL
EOSINOPHIL NFR BLD AUTO: 4.8 %
ESTIMATED AVERAGE GLUCOSE: 192 MG/DL
GLUCOSE QUALITATIVE U: NEGATIVE
GLUCOSE SERPL-MCNC: 144 MG/DL
HBA1C MFR BLD HPLC: 8.3 %
HCT VFR BLD CALC: 48.9 %
HDLC SERPL-MCNC: 41 MG/DL
HGB BLD-MCNC: 15.9 G/DL
HYALINE CASTS: 0 /LPF
IMM GRANULOCYTES NFR BLD AUTO: 0.4 %
KETONES URINE: NEGATIVE
LDLC SERPL CALC-MCNC: 120 MG/DL
LEUKOCYTE ESTERASE URINE: NEGATIVE
LYMPHOCYTES # BLD AUTO: 3.09 K/UL
LYMPHOCYTES NFR BLD AUTO: 31.8 %
MAN DIFF?: NORMAL
MCHC RBC-ENTMCNC: 28.2 PG
MCHC RBC-ENTMCNC: 32.5 GM/DL
MCV RBC AUTO: 86.9 FL
MICROALBUMIN 24H UR DL<=1MG/L-MCNC: 1.2 MG/DL
MICROALBUMIN/CREAT 24H UR-RTO: 19 MG/G
MICROSCOPIC-UA: NORMAL
MONOCYTES # BLD AUTO: 0.8 K/UL
MONOCYTES NFR BLD AUTO: 8.2 %
NEUTROPHILS # BLD AUTO: 5.28 K/UL
NEUTROPHILS NFR BLD AUTO: 54.3 %
NITRITE URINE: NEGATIVE
NONHDLC SERPL-MCNC: 183 MG/DL
PH URINE: 6.5
PLATELET # BLD AUTO: 256 K/UL
POTASSIUM SERPL-SCNC: 4.6 MMOL/L
PROT SERPL-MCNC: 7 G/DL
PROTEIN URINE: NEGATIVE
RBC # BLD: 5.63 M/UL
RBC # FLD: 12.5 %
RED BLOOD CELLS URINE: 1 /HPF
SODIUM SERPL-SCNC: 138 MMOL/L
SPECIFIC GRAVITY URINE: 1.02
SQUAMOUS EPITHELIAL CELLS: 0 /HPF
TRIGL SERPL-MCNC: 317 MG/DL
TSH SERPL-ACNC: 1.56 UIU/ML
UROBILINOGEN URINE: NORMAL
WBC # FLD AUTO: 9.73 K/UL
WHITE BLOOD CELLS URINE: 1 /HPF

## 2022-05-18 ENCOUNTER — NON-APPOINTMENT (OUTPATIENT)
Age: 37
End: 2022-05-18

## 2022-05-18 ENCOUNTER — APPOINTMENT (OUTPATIENT)
Dept: CARDIOLOGY | Facility: CLINIC | Age: 37
End: 2022-05-18
Payer: COMMERCIAL

## 2022-05-18 VITALS
WEIGHT: 280 LBS | SYSTOLIC BLOOD PRESSURE: 156 MMHG | OXYGEN SATURATION: 99 % | HEART RATE: 88 BPM | HEIGHT: 76 IN | TEMPERATURE: 97.6 F | BODY MASS INDEX: 34.1 KG/M2 | DIASTOLIC BLOOD PRESSURE: 96 MMHG

## 2022-05-18 PROCEDURE — 93000 ELECTROCARDIOGRAM COMPLETE: CPT

## 2022-05-18 PROCEDURE — 99215 OFFICE O/P EST HI 40 MIN: CPT

## 2022-05-18 RX ORDER — KETOCONAZOLE 20 MG/G
2 CREAM TOPICAL
Qty: 30 | Refills: 0 | Status: DISCONTINUED | COMMUNITY
Start: 2021-09-01 | End: 2022-05-18

## 2022-06-22 ENCOUNTER — APPOINTMENT (OUTPATIENT)
Dept: CARDIOLOGY | Facility: CLINIC | Age: 37
End: 2022-06-22
Payer: COMMERCIAL

## 2022-06-22 VITALS
OXYGEN SATURATION: 99 % | BODY MASS INDEX: 33.35 KG/M2 | TEMPERATURE: 97.6 F | WEIGHT: 274 LBS | HEART RATE: 70 BPM | SYSTOLIC BLOOD PRESSURE: 130 MMHG | DIASTOLIC BLOOD PRESSURE: 90 MMHG

## 2022-06-22 PROCEDURE — 99214 OFFICE O/P EST MOD 30 MIN: CPT

## 2022-08-15 ENCOUNTER — APPOINTMENT (OUTPATIENT)
Dept: CARDIOLOGY | Facility: CLINIC | Age: 37
End: 2022-08-15

## 2022-08-15 VITALS
DIASTOLIC BLOOD PRESSURE: 92 MMHG | BODY MASS INDEX: 33.11 KG/M2 | WEIGHT: 272 LBS | HEART RATE: 86 BPM | OXYGEN SATURATION: 95 % | TEMPERATURE: 97.3 F | SYSTOLIC BLOOD PRESSURE: 148 MMHG

## 2022-08-15 PROCEDURE — 99214 OFFICE O/P EST MOD 30 MIN: CPT

## 2022-10-17 ENCOUNTER — APPOINTMENT (OUTPATIENT)
Dept: CARDIOLOGY | Facility: CLINIC | Age: 37
End: 2022-10-17

## 2022-11-16 ENCOUNTER — APPOINTMENT (OUTPATIENT)
Dept: CARDIOLOGY | Facility: CLINIC | Age: 37
End: 2022-11-16

## 2022-11-16 VITALS
TEMPERATURE: 97.8 F | BODY MASS INDEX: 32.87 KG/M2 | WEIGHT: 270 LBS | SYSTOLIC BLOOD PRESSURE: 108 MMHG | DIASTOLIC BLOOD PRESSURE: 70 MMHG | HEART RATE: 84 BPM | OXYGEN SATURATION: 96 %

## 2022-11-16 PROCEDURE — 99214 OFFICE O/P EST MOD 30 MIN: CPT

## 2023-01-18 ENCOUNTER — APPOINTMENT (OUTPATIENT)
Dept: CARDIOLOGY | Facility: CLINIC | Age: 38
End: 2023-01-18
Payer: COMMERCIAL

## 2023-01-18 VITALS — HEART RATE: 78 BPM | OXYGEN SATURATION: 96 % | SYSTOLIC BLOOD PRESSURE: 102 MMHG | DIASTOLIC BLOOD PRESSURE: 72 MMHG

## 2023-01-18 VITALS — TEMPERATURE: 97.8 F | BODY MASS INDEX: 31.65 KG/M2 | WEIGHT: 260 LBS

## 2023-01-18 PROCEDURE — 99214 OFFICE O/P EST MOD 30 MIN: CPT

## 2023-03-23 ENCOUNTER — APPOINTMENT (OUTPATIENT)
Dept: CARDIOLOGY | Facility: CLINIC | Age: 38
End: 2023-03-23
Payer: COMMERCIAL

## 2023-03-23 VITALS
TEMPERATURE: 97.8 F | BODY MASS INDEX: 31.65 KG/M2 | SYSTOLIC BLOOD PRESSURE: 110 MMHG | DIASTOLIC BLOOD PRESSURE: 80 MMHG | HEART RATE: 81 BPM | OXYGEN SATURATION: 97 % | WEIGHT: 260 LBS

## 2023-03-23 DIAGNOSIS — R94.31 ABNORMAL ELECTROCARDIOGRAM [ECG] [EKG]: ICD-10-CM

## 2023-03-23 PROCEDURE — 99214 OFFICE O/P EST MOD 30 MIN: CPT

## 2023-06-20 ENCOUNTER — APPOINTMENT (OUTPATIENT)
Dept: CARDIOLOGY | Facility: CLINIC | Age: 38
End: 2023-06-20

## 2023-06-28 ENCOUNTER — TRANSCRIPTION ENCOUNTER (OUTPATIENT)
Age: 38
End: 2023-06-28

## 2023-08-10 ENCOUNTER — RX RENEWAL (OUTPATIENT)
Age: 38
End: 2023-08-10

## 2023-11-07 ENCOUNTER — TRANSCRIPTION ENCOUNTER (OUTPATIENT)
Age: 38
End: 2023-11-07

## 2023-11-18 ENCOUNTER — RX RENEWAL (OUTPATIENT)
Age: 38
End: 2023-11-18

## 2023-11-21 LAB — HBA1C MFR BLD HPLC: 5.8

## 2024-01-24 ENCOUNTER — APPOINTMENT (OUTPATIENT)
Dept: CARDIOLOGY | Facility: CLINIC | Age: 39
End: 2024-01-24
Payer: COMMERCIAL

## 2024-01-24 ENCOUNTER — NON-APPOINTMENT (OUTPATIENT)
Age: 39
End: 2024-01-24

## 2024-01-24 VITALS
HEIGHT: 74 IN | HEART RATE: 80 BPM | OXYGEN SATURATION: 98 % | SYSTOLIC BLOOD PRESSURE: 144 MMHG | DIASTOLIC BLOOD PRESSURE: 96 MMHG | WEIGHT: 270 LBS | BODY MASS INDEX: 34.65 KG/M2

## 2024-01-24 DIAGNOSIS — E66.9 OBESITY, UNSPECIFIED: ICD-10-CM

## 2024-01-24 DIAGNOSIS — Z00.00 ENCOUNTER FOR GENERAL ADULT MEDICAL EXAMINATION W/OUT ABNORMAL FINDINGS: ICD-10-CM

## 2024-01-24 DIAGNOSIS — N13.2 HYDRONEPHROSIS WITH RENAL AND URETERAL CALCULOUS OBSTRUCTION: ICD-10-CM

## 2024-01-24 DIAGNOSIS — D75.1 SECONDARY POLYCYTHEMIA: ICD-10-CM

## 2024-01-24 DIAGNOSIS — F17.200 NICOTINE DEPENDENCE, UNSPECIFIED, UNCOMPLICATED: ICD-10-CM

## 2024-01-24 PROCEDURE — 93000 ELECTROCARDIOGRAM COMPLETE: CPT

## 2024-01-24 PROCEDURE — 99204 OFFICE O/P NEW MOD 45 MIN: CPT | Mod: 25

## 2024-01-24 RX ORDER — METOPROLOL SUCCINATE 50 MG/1
50 TABLET, EXTENDED RELEASE ORAL
Qty: 90 | Refills: 3 | Status: COMPLETED | COMMUNITY
Start: 2021-10-18 | End: 2024-01-24

## 2024-01-24 NOTE — DISCUSSION/SUMMARY
[FreeTextEntry1] :  38 M with HTN, HLD, DM II, and obesity who has "fallen off the wagon" with therapies, and is feeling the consequences of it. We will restart his medications and he will undergo his employment required stress testing -- if any abnormalities, per patient wish, cath. Otherwise, will obtain lipids and A1c and CMP to check liver and renal function, restart medications, discontinue metoprolol which is offering no tangible benefit, and give patient an opportunity to return to his optimized state from one year ago. If unable to do so, we will increase antihypertensive regimen (amlodipine 5 mg qhs) and be more aggressive with diabetic control.   Plan:   - Refill metformin, ozempic, and rosuvastatin   - Treadmill stress ECG (consented!)    RTC: 4-6 weeks, after testing complete.   Appreciate the opportunity to participate in the care of Mr. VAZQUEZ. Strict ER precautions were provided to patient should symptoms worsen, or new ones present. Please do not hesitate to reach out with any questions, concerns, or changes in clinical status.   Richie Ulrich MD Interventional Cardiology

## 2024-01-24 NOTE — HISTORY OF PRESENT ILLNESS
[FreeTextEntry1] :  38 year old gentleman with HTN, HLD, obesity, DM II who presents for re-establishment of cardiovascular care.   Since last visit, has not been taking metformin or ozempic. He would like to restart those as his sugars are now in the 200s and he is feeling quite sluggish. BP also elevated to SBP 140s-150s -- previously had SBPs 105-110.   Denies chest discomfort, palpitations, shortness of breath.

## 2024-02-29 ENCOUNTER — APPOINTMENT (OUTPATIENT)
Dept: CARDIOLOGY | Facility: CLINIC | Age: 39
End: 2024-02-29

## 2024-03-12 ENCOUNTER — NON-APPOINTMENT (OUTPATIENT)
Age: 39
End: 2024-03-12

## 2024-06-12 ENCOUNTER — APPOINTMENT (OUTPATIENT)
Dept: CARDIOLOGY | Facility: CLINIC | Age: 39
End: 2024-06-12
Payer: COMMERCIAL

## 2024-06-12 VITALS
SYSTOLIC BLOOD PRESSURE: 140 MMHG | DIASTOLIC BLOOD PRESSURE: 80 MMHG | HEART RATE: 85 BPM | HEIGHT: 74 IN | OXYGEN SATURATION: 98 %

## 2024-06-12 DIAGNOSIS — E11.9 TYPE 2 DIABETES MELLITUS W/OUT COMPLICATIONS: ICD-10-CM

## 2024-06-12 DIAGNOSIS — I10 ESSENTIAL (PRIMARY) HYPERTENSION: ICD-10-CM

## 2024-06-12 DIAGNOSIS — K76.0 FATTY (CHANGE OF) LIVER, NOT ELSEWHERE CLASSIFIED: ICD-10-CM

## 2024-06-12 DIAGNOSIS — E78.2 MIXED HYPERLIPIDEMIA: ICD-10-CM

## 2024-06-12 PROCEDURE — G2211 COMPLEX E/M VISIT ADD ON: CPT | Mod: NC

## 2024-06-12 PROCEDURE — 99214 OFFICE O/P EST MOD 30 MIN: CPT

## 2024-06-12 RX ORDER — METFORMIN ER 500 MG 500 MG/1
500 TABLET ORAL
Qty: 360 | Refills: 1 | Status: DISCONTINUED | COMMUNITY
Start: 2018-08-08 | End: 2024-06-12

## 2024-06-12 RX ORDER — ROSUVASTATIN CALCIUM 20 MG/1
20 TABLET, FILM COATED ORAL
Qty: 90 | Refills: 2 | Status: ACTIVE | COMMUNITY
Start: 2022-05-18 | End: 1900-01-01

## 2024-06-12 RX ORDER — FENOFIBRATE 145 MG/1
145 TABLET, COATED ORAL
Qty: 90 | Refills: 2 | Status: ACTIVE | COMMUNITY
Start: 2022-05-18 | End: 1900-01-01

## 2024-06-12 RX ORDER — LOSARTAN POTASSIUM 100 MG/1
100 TABLET, FILM COATED ORAL
Qty: 90 | Refills: 3 | Status: ACTIVE | COMMUNITY
Start: 2019-12-02 | End: 1900-01-01

## 2024-06-12 RX ORDER — SEMAGLUTIDE 1.34 MG/ML
4 INJECTION, SOLUTION SUBCUTANEOUS
Qty: 16 | Refills: 3 | Status: ACTIVE | COMMUNITY
Start: 2022-05-18 | End: 1900-01-01

## 2024-06-12 RX ORDER — METFORMIN HYDROCHLORIDE 1000 MG/1
1000 TABLET, EXTENDED RELEASE ORAL DAILY
Qty: 90 | Refills: 2 | Status: ACTIVE | COMMUNITY
Start: 2024-06-12 | End: 1900-01-01

## 2024-06-12 NOTE — CARDIOLOGY SUMMARY
[de-identified] : TST 04/08/2022:  10 METS. Normal test.  [de-identified] : TTE 02/2024 (report from outside facility): Normal LVEF. LVH. Trace TR -- grossly normal valvular structure and function.  [de-identified] : 11/18/23:  A1c 5.8

## 2024-06-12 NOTE — HISTORY OF PRESENT ILLNESS
[FreeTextEntry1] : 38 year old gentleman with HTN, HLD, obesity, DM II who presents for follow-up.  Patient was unable to make his last appointment due to the birth of his child.   He and his family are doing well.  Labs reflect that his A1c remains elevated at approximately 9.  His lipid panel showed tremendous improvement.  He is on rosuvastatin 10 mg every evening.  He notes that he has not been adherent with his metformin standard release 1000 mg twice daily.  I believe this may be because of the pill burden as well as from the side effects of standard dosing metformin and we discussed extended release osmolar form.   He also brought in the results of a transthoracic echo that was performed at the [facility] for screening purposes.  Intact LV systolic function.  Normal diastolic function.  Mild left ventricular hypertrophy.  No significant valvular structural or functional abnormalities.  Denies chest discomfort, palpitations, shortness of breath.

## 2024-08-01 ENCOUNTER — APPOINTMENT (OUTPATIENT)
Dept: CARDIOLOGY | Facility: CLINIC | Age: 39
End: 2024-08-01

## 2024-11-15 ENCOUNTER — APPOINTMENT (OUTPATIENT)
Dept: CARDIOLOGY | Facility: CLINIC | Age: 39
End: 2024-11-15

## 2024-11-15 LAB — HBA1C MFR BLD HPLC: 9.5

## 2024-12-24 PROBLEM — F10.90 ALCOHOL USE: Status: RESOLVED | Noted: 2017-11-02 | Resolved: 2024-12-24

## 2025-06-20 ENCOUNTER — APPOINTMENT (OUTPATIENT)
Dept: INTERNAL MEDICINE | Facility: CLINIC | Age: 40
End: 2025-06-20
Payer: COMMERCIAL

## 2025-06-20 VITALS
DIASTOLIC BLOOD PRESSURE: 80 MMHG | OXYGEN SATURATION: 97 % | TEMPERATURE: 98.3 F | SYSTOLIC BLOOD PRESSURE: 138 MMHG | HEIGHT: 74 IN | HEART RATE: 89 BPM | WEIGHT: 268 LBS | BODY MASS INDEX: 34.39 KG/M2

## 2025-06-20 PROCEDURE — G2211 COMPLEX E/M VISIT ADD ON: CPT | Mod: NC

## 2025-06-20 PROCEDURE — 99204 OFFICE O/P NEW MOD 45 MIN: CPT

## 2025-06-20 PROCEDURE — 36415 COLL VENOUS BLD VENIPUNCTURE: CPT

## 2025-06-23 RX ORDER — METFORMIN HYDROCHLORIDE 1000 MG/1
1000 TABLET, FILM COATED, EXTENDED RELEASE ORAL DAILY
Qty: 90 | Refills: 1 | Status: DISCONTINUED | COMMUNITY
Start: 2025-06-20 | End: 2025-06-23

## 2025-06-23 RX ORDER — METFORMIN ER 500 MG 500 MG/1
500 TABLET ORAL
Qty: 180 | Refills: 1 | Status: ACTIVE | COMMUNITY
Start: 2025-06-23 | End: 1900-01-01

## 2025-06-27 ENCOUNTER — TRANSCRIPTION ENCOUNTER (OUTPATIENT)
Age: 40
End: 2025-06-27

## 2025-06-27 LAB
ALBUMIN SERPL ELPH-MCNC: 4.9 G/DL
ALP BLD-CCNC: 139 U/L
ALT SERPL-CCNC: 37 U/L
ANION GAP SERPL CALC-SCNC: 15 MMOL/L
AST SERPL-CCNC: 19 U/L
BASOPHILS # BLD AUTO: 0.08 K/UL
BASOPHILS NFR BLD AUTO: 0.8 %
BILIRUB SERPL-MCNC: 0.5 MG/DL
BUN SERPL-MCNC: 17 MG/DL
CALCIUM SERPL-MCNC: 10.3 MG/DL
CHLORIDE SERPL-SCNC: 95 MMOL/L
CHOLEST SERPL-MCNC: 172 MG/DL
CO2 SERPL-SCNC: 25 MMOL/L
CREAT SERPL-MCNC: 0.8 MG/DL
CREAT SPEC-SCNC: 108 MG/DL
EGFRCR SERPLBLD CKD-EPI 2021: 114 ML/MIN/1.73M2
EOSINOPHIL # BLD AUTO: 0.32 K/UL
EOSINOPHIL NFR BLD AUTO: 3.1 %
ESTIMATED AVERAGE GLUCOSE: 209 MG/DL
GLUCOSE SERPL-MCNC: 242 MG/DL
HBA1C MFR BLD HPLC: 8.9 %
HCT VFR BLD CALC: 48 %
HDLC SERPL-MCNC: 55 MG/DL
HGB BLD-MCNC: 16.1 G/DL
IMM GRANULOCYTES NFR BLD AUTO: 0.7 %
LDLC SERPL-MCNC: 94 MG/DL
LYMPHOCYTES # BLD AUTO: 2.93 K/UL
LYMPHOCYTES NFR BLD AUTO: 28.4 %
MAN DIFF?: NORMAL
MCHC RBC-ENTMCNC: 28.3 PG
MCHC RBC-ENTMCNC: 33.5 G/DL
MCV RBC AUTO: 84.4 FL
MICROALBUMIN 24H UR DL<=1MG/L-MCNC: 4.4 MG/DL
MICROALBUMIN/CREAT 24H UR-RTO: 41 MG/G
MONOCYTES # BLD AUTO: 0.7 K/UL
MONOCYTES NFR BLD AUTO: 6.8 %
NEUTROPHILS # BLD AUTO: 6.22 K/UL
NEUTROPHILS NFR BLD AUTO: 60.2 %
NONHDLC SERPL-MCNC: 117 MG/DL
PLATELET # BLD AUTO: 282 K/UL
POTASSIUM SERPL-SCNC: 4.6 MMOL/L
PROT SERPL-MCNC: 7.6 G/DL
RBC # BLD: 5.69 M/UL
RBC # FLD: 12.8 %
SODIUM SERPL-SCNC: 135 MMOL/L
TRIGL SERPL-MCNC: 126 MG/DL
WBC # FLD AUTO: 10.32 K/UL

## 2025-06-27 RX ORDER — TIRZEPATIDE 2.5 MG/.5ML
2.5 INJECTION, SOLUTION SUBCUTANEOUS
Qty: 1 | Refills: 0 | Status: ACTIVE | COMMUNITY
Start: 2025-06-27 | End: 1900-01-01

## 2025-07-17 ENCOUNTER — TRANSCRIPTION ENCOUNTER (OUTPATIENT)
Age: 40
End: 2025-07-17

## 2025-07-28 ENCOUNTER — TRANSCRIPTION ENCOUNTER (OUTPATIENT)
Age: 40
End: 2025-07-28

## 2025-08-04 ENCOUNTER — RX RENEWAL (OUTPATIENT)
Age: 40
End: 2025-08-04

## 2025-08-04 ENCOUNTER — TRANSCRIPTION ENCOUNTER (OUTPATIENT)
Age: 40
End: 2025-08-04

## 2025-09-04 ENCOUNTER — TRANSCRIPTION ENCOUNTER (OUTPATIENT)
Age: 40
End: 2025-09-04